# Patient Record
Sex: MALE | Race: WHITE | NOT HISPANIC OR LATINO | Employment: OTHER | URBAN - METROPOLITAN AREA
[De-identification: names, ages, dates, MRNs, and addresses within clinical notes are randomized per-mention and may not be internally consistent; named-entity substitution may affect disease eponyms.]

---

## 2017-04-12 ENCOUNTER — HOSPITAL ENCOUNTER (OUTPATIENT)
Dept: NON INVASIVE DIAGNOSTICS | Facility: CLINIC | Age: 82
Discharge: HOME | End: 2017-04-12
Payer: MEDICARE

## 2017-04-12 DIAGNOSIS — I10 ESSENTIAL (PRIMARY) HYPERTENSION: ICD-10-CM

## 2017-04-12 DIAGNOSIS — E78.5 HYPERLIPIDEMIA: ICD-10-CM

## 2017-04-12 DIAGNOSIS — R00.2 PALPITATIONS: ICD-10-CM

## 2017-04-12 PROCEDURE — 93306 TTE W/DOPPLER COMPLETE: CPT

## 2017-09-12 ENCOUNTER — ALLSCRIPTS OFFICE VISIT (OUTPATIENT)
Dept: OTHER | Facility: OTHER | Age: 82
End: 2017-09-12

## 2018-01-13 VITALS
WEIGHT: 204.56 LBS | SYSTOLIC BLOOD PRESSURE: 104 MMHG | HEART RATE: 83 BPM | DIASTOLIC BLOOD PRESSURE: 72 MMHG | HEIGHT: 72 IN | BODY MASS INDEX: 27.71 KG/M2

## 2018-07-25 ENCOUNTER — APPOINTMENT (EMERGENCY)
Dept: RADIOLOGY | Facility: HOSPITAL | Age: 83
End: 2018-07-25
Payer: MEDICARE

## 2018-07-25 ENCOUNTER — HOSPITAL ENCOUNTER (EMERGENCY)
Facility: HOSPITAL | Age: 83
Discharge: HOME | End: 2018-07-25
Attending: EMERGENCY MEDICINE
Payer: MEDICARE

## 2018-07-25 VITALS
DIASTOLIC BLOOD PRESSURE: 65 MMHG | WEIGHT: 221.56 LBS | OXYGEN SATURATION: 97 % | RESPIRATION RATE: 26 BRPM | BODY MASS INDEX: 30.05 KG/M2 | SYSTOLIC BLOOD PRESSURE: 142 MMHG | TEMPERATURE: 99.8 F | HEART RATE: 79 BPM

## 2018-07-25 DIAGNOSIS — J18.9 COMMUNITY ACQUIRED PNEUMONIA: Primary | ICD-10-CM

## 2018-07-25 LAB
ALBUMIN SERPL BCP-MCNC: 3.5 G/DL (ref 3.5–5)
ALP SERPL-CCNC: 63 U/L (ref 46–116)
ALT SERPL W P-5'-P-CCNC: 19 U/L (ref 12–78)
ANION GAP SERPL CALCULATED.3IONS-SCNC: 6 MMOL/L (ref 4–13)
AST SERPL W P-5'-P-CCNC: 17 U/L (ref 5–45)
BACTERIA UR QL AUTO: ABNORMAL /HPF
BASOPHILS # BLD AUTO: 0.05 THOUSANDS/ΜL (ref 0–0.1)
BASOPHILS NFR BLD AUTO: 0 % (ref 0–1)
BILIRUB SERPL-MCNC: 1.18 MG/DL (ref 0.2–1)
BILIRUB UR QL STRIP: ABNORMAL
BUN SERPL-MCNC: 16 MG/DL (ref 5–25)
CALCIUM SERPL-MCNC: 9.5 MG/DL (ref 8.3–10.1)
CAOX CRY URNS QL MICRO: ABNORMAL /HPF
CHLORIDE SERPL-SCNC: 106 MMOL/L (ref 100–108)
CLARITY UR: CLEAR
CO2 SERPL-SCNC: 25 MMOL/L (ref 21–32)
COLOR UR: ABNORMAL
COLOR, POC: YELLOW
CREAT SERPL-MCNC: 1.12 MG/DL (ref 0.6–1.3)
EOSINOPHIL # BLD AUTO: 0.02 THOUSAND/ΜL (ref 0–0.61)
EOSINOPHIL NFR BLD AUTO: 0 % (ref 0–6)
ERYTHROCYTE [DISTWIDTH] IN BLOOD BY AUTOMATED COUNT: 13 % (ref 11.6–15.1)
GFR SERPL CREATININE-BSD FRML MDRD: 60 ML/MIN/1.73SQ M
GLUCOSE SERPL-MCNC: 129 MG/DL (ref 65–140)
GLUCOSE SERPL-MCNC: 133 MG/DL (ref 65–140)
GLUCOSE UR STRIP-MCNC: NEGATIVE MG/DL
HCT VFR BLD AUTO: 42.6 % (ref 36.5–49.3)
HGB BLD-MCNC: 14.2 G/DL (ref 12–17)
HGB UR QL STRIP.AUTO: ABNORMAL
IMM GRANULOCYTES # BLD AUTO: 0.08 THOUSAND/UL (ref 0–0.2)
IMM GRANULOCYTES NFR BLD AUTO: 1 % (ref 0–2)
KETONES UR STRIP-MCNC: ABNORMAL MG/DL
LACTATE SERPL-SCNC: 1.4 MMOL/L (ref 0.5–2)
LEUKOCYTE ESTERASE UR QL STRIP: NEGATIVE
LIPASE SERPL-CCNC: 77 U/L (ref 73–393)
LYMPHOCYTES # BLD AUTO: 0.78 THOUSANDS/ΜL (ref 0.6–4.47)
LYMPHOCYTES NFR BLD AUTO: 6 % (ref 14–44)
MCH RBC QN AUTO: 31.7 PG (ref 26.8–34.3)
MCHC RBC AUTO-ENTMCNC: 33.3 G/DL (ref 31.4–37.4)
MCV RBC AUTO: 95 FL (ref 82–98)
MONOCYTES # BLD AUTO: 1.26 THOUSAND/ΜL (ref 0.17–1.22)
MONOCYTES NFR BLD AUTO: 10 % (ref 4–12)
MUCOUS THREADS UR QL AUTO: ABNORMAL
NEUTROPHILS # BLD AUTO: 11.07 THOUSANDS/ΜL (ref 1.85–7.62)
NEUTS SEG NFR BLD AUTO: 83 % (ref 43–75)
NITRITE UR QL STRIP: NEGATIVE
NON-SQ EPI CELLS URNS QL MICRO: ABNORMAL /HPF
NRBC BLD AUTO-RTO: 0 /100 WBCS
PH UR STRIP.AUTO: 6 [PH] (ref 4.5–8)
PLATELET # BLD AUTO: 227 THOUSANDS/UL (ref 149–390)
PMV BLD AUTO: 9.5 FL (ref 8.9–12.7)
POTASSIUM SERPL-SCNC: 3.7 MMOL/L (ref 3.5–5.3)
PROT SERPL-MCNC: 7.7 G/DL (ref 6.4–8.2)
PROT UR STRIP-MCNC: ABNORMAL MG/DL
RBC # BLD AUTO: 4.48 MILLION/UL (ref 3.88–5.62)
RBC #/AREA URNS AUTO: ABNORMAL /HPF
SODIUM SERPL-SCNC: 137 MMOL/L (ref 136–145)
SP GR UR STRIP.AUTO: 1.02 (ref 1–1.03)
TROPONIN I SERPL-MCNC: <0.02 NG/ML
UROBILINOGEN UR QL STRIP.AUTO: 1 E.U./DL
WBC # BLD AUTO: 13.26 THOUSAND/UL (ref 4.31–10.16)
WBC #/AREA URNS AUTO: ABNORMAL /HPF

## 2018-07-25 PROCEDURE — 36415 COLL VENOUS BLD VENIPUNCTURE: CPT | Performed by: EMERGENCY MEDICINE

## 2018-07-25 PROCEDURE — 71046 X-RAY EXAM CHEST 2 VIEWS: CPT

## 2018-07-25 PROCEDURE — 96361 HYDRATE IV INFUSION ADD-ON: CPT

## 2018-07-25 PROCEDURE — 96365 THER/PROPH/DIAG IV INF INIT: CPT

## 2018-07-25 PROCEDURE — 81001 URINALYSIS AUTO W/SCOPE: CPT

## 2018-07-25 PROCEDURE — 99285 EMERGENCY DEPT VISIT HI MDM: CPT

## 2018-07-25 PROCEDURE — 84484 ASSAY OF TROPONIN QUANT: CPT | Performed by: EMERGENCY MEDICINE

## 2018-07-25 PROCEDURE — 93005 ELECTROCARDIOGRAM TRACING: CPT

## 2018-07-25 PROCEDURE — 80053 COMPREHEN METABOLIC PANEL: CPT | Performed by: EMERGENCY MEDICINE

## 2018-07-25 PROCEDURE — 83605 ASSAY OF LACTIC ACID: CPT | Performed by: EMERGENCY MEDICINE

## 2018-07-25 PROCEDURE — 87040 BLOOD CULTURE FOR BACTERIA: CPT | Performed by: EMERGENCY MEDICINE

## 2018-07-25 PROCEDURE — 83690 ASSAY OF LIPASE: CPT | Performed by: EMERGENCY MEDICINE

## 2018-07-25 PROCEDURE — 70450 CT HEAD/BRAIN W/O DYE: CPT

## 2018-07-25 PROCEDURE — 82948 REAGENT STRIP/BLOOD GLUCOSE: CPT

## 2018-07-25 PROCEDURE — 71250 CT THORAX DX C-: CPT

## 2018-07-25 PROCEDURE — 96375 TX/PRO/DX INJ NEW DRUG ADDON: CPT

## 2018-07-25 PROCEDURE — 85025 COMPLETE CBC W/AUTO DIFF WBC: CPT | Performed by: EMERGENCY MEDICINE

## 2018-07-25 RX ORDER — MULTIVIT-MIN/IRON/FOLIC ACID/K 18-600-40
1000 CAPSULE ORAL DAILY
COMMUNITY

## 2018-07-25 RX ORDER — AMLODIPINE BESYLATE 5 MG/1
5 TABLET ORAL DAILY
COMMUNITY
End: 2018-11-21 | Stop reason: SDUPTHER

## 2018-07-25 RX ORDER — MULTIVITAMIN
1 CAPSULE ORAL DAILY
COMMUNITY
End: 2021-01-08

## 2018-07-25 RX ORDER — ONDANSETRON 2 MG/ML
4 INJECTION INTRAMUSCULAR; INTRAVENOUS ONCE
Status: COMPLETED | OUTPATIENT
Start: 2018-07-25 | End: 2018-07-25

## 2018-07-25 RX ORDER — MULTIVIT-MIN/IRON/FOLIC ACID/K 18-600-40
CAPSULE ORAL
COMMUNITY
End: 2019-09-17

## 2018-07-25 RX ORDER — TERAZOSIN 5 MG/1
5 CAPSULE ORAL
COMMUNITY

## 2018-07-25 RX ORDER — ASPIRIN 81 MG/1
81 TABLET, CHEWABLE ORAL EVERY OTHER DAY
COMMUNITY

## 2018-07-25 RX ORDER — AZITHROMYCIN 250 MG/1
TABLET, FILM COATED ORAL
Qty: 6 TABLET | Refills: 0 | Status: SHIPPED | OUTPATIENT
Start: 2018-07-25 | End: 2018-07-29

## 2018-07-25 RX ADMIN — CEFTRIAXONE 1000 MG: 1 INJECTION, POWDER, FOR SOLUTION INTRAMUSCULAR; INTRAVENOUS at 18:43

## 2018-07-25 RX ADMIN — SODIUM CHLORIDE 1000 ML: 0.9 INJECTION, SOLUTION INTRAVENOUS at 17:36

## 2018-07-25 RX ADMIN — AZITHROMYCIN MONOHYDRATE 500 MG: 500 INJECTION, POWDER, LYOPHILIZED, FOR SOLUTION INTRAVENOUS at 19:20

## 2018-07-25 RX ADMIN — ONDANSETRON 4 MG: 2 INJECTION, SOLUTION INTRAMUSCULAR; INTRAVENOUS at 17:36

## 2018-07-25 NOTE — DISCHARGE INSTRUCTIONS
Please take the antibiotics I prescribed you and follow-up with your primary doctor in approximately 3 days for re-evaluation and further treatment  Return to the ER for new or worrisome symptoms including worsening shortness of breath  Community Acquired Pneumonia   WHAT YOU NEED TO KNOW:   Community-acquired pneumonia (CAP) is a lung infection that you get outside of a hospital or nursing home setting  Your lungs become inflamed and cannot work well  CAP may be caused by bacteria, viruses, or fungi  DISCHARGE INSTRUCTIONS:   Return to the emergency department if:   · You are confused and cannot think clearly  · You have increased trouble breathing  · Your lips or fingernails turn gray or blue  Contact your healthcare provider if:   · Your symptoms do not get better, or they get worse  · You are urinating less, or not at all  · You have questions or concerns about your condition or care  Medicines:   · Medicines  may be given to treat a bacterial, viral, or fungal infection  You may also be given medicines to dilate your bronchial tubes to help you breathe more easily  · Take your medicine as directed  Contact your healthcare provider if you think your medicine is not helping or if you have side effects  Tell him or her if you are allergic to any medicine  Keep a list of the medicines, vitamins, and herbs you take  Include the amounts, and when and why you take them  Bring the list or the pill bottles to follow-up visits  Carry your medicine list with you in case of an emergency  Follow up with your healthcare provider within 3 days or as directed: You may need another x-ray  Write down your questions so you remember to ask them during your visits  Deep breathing and coughing:  Deep breathing helps open the air passages in your lungs  Coughing helps bring up mucus from your lungs  Take a deep breath and hold the breath as long as you can   Then push the air out of your lungs with a deep, strong cough  Spit out any mucus you have coughed up  Take 10 deep breaths in a row every hour that you are awake  Remember to follow each deep breath with a cough  Do not smoke or allow others to smoke around you:  Nicotine and other chemicals in cigarettes and cigars can cause lung damage  Ask your healthcare provider for information if you currently smoke and need help to quit  E-cigarettes or smokeless tobacco still contain nicotine  Talk to your healthcare provider before you use these products  Manage CAP at home:   · Breathe warm, moist air  This helps loosen mucus  Loosely place a warm, wet washcloth over your nose and mouth  A room humidifier may also help make the air moist     · Drink liquids as directed  Ask your healthcare provider how much liquid to drink each day and which liquids to drink  Liquids help make mucus thin and easier to get out of your body  · Gently tap your chest   This helps loosen mucus so it is easier to cough  Lie with your head lower than your chest several times a day and tap your chest      · Get plenty of rest   Rest helps your body heal   Prevent CAP:   · Wash your hands often with soap and water  Carry germ-killing hand gel with you  You can use the gel to clean your hands when soap and water are not available  Do not touch your eyes, nose, or mouth unless you have washed your hands first      · Clean surfaces often  Clean doorknobs, countertops, cell phones, and other surfaces that are touched often  · Always cover your mouth when you cough  Cough into a tissue or your shirtsleeve so you do not spread germs from your hands  · Try to avoid people who have a cold or the flu  If you are sick, stay away from others as much as possible  · Ask about vaccines  You may need a vaccine to help prevent pneumonia  Get an influenza (flu) vaccine every year as soon as it becomes available    © 2017 [address] [employer] Information is for End User's use only and may not be sold, redistributed or otherwise used for commercial purposes  All illustrations and images included in CareNotes® are the copyrighted property of [employer]  or [employer]  The above information is an  only  It is not intended as medical advice for individual conditions or treatments  Talk to your doctor, nurse or pharmacist before following any medical regimen to see if it is safe and effective for you  Azithromycin (By mouth)   Azithromycin ([id number])  Treats infections  This medicine is a macrolide antibiotic  Brand Name(s): Zithromax, Zithromax Tri-Ran, Zithromax Z-Ran, Zmax   There may be other brand names for this medicine  When This Medicine Should Not Be Used: This medicine is not right for everyone  Do not use it if you had an allergic reaction to azithromycin, erythromycin, or similar medicines, or you have a history of liver problems caused by azithromycin  How to Use This Medicine:   Capsule, Liquid, Packet, Powder, Tablet  · Your doctor will tell you how much medicine to use  Do not use more than directed  · Take all of the medicine in your prescription to clear up your infection, even if you feel better after the first few doses  · Read and follow the patient instructions that come with this medicine  Talk to your doctor or pharmacist if you have any questions  · Multiple dose (Zithromax® oral liquid or tablets):   ¨ You may take this medicine with or without food  ¨ Shake the bottle well before you measure the medicine  Measure the oral liquid medicine with a marked measuring spoon, oral syringe, or medicine cup  · Single dose (Zmax® extended-release oral liquid or powder):   ¨ Liquid:   § Take this medicine on an empty stomach at least 1 hour before you eat, or 2 hours after you eat  § Call your doctor right away if you vomit within 1 hour after you take the medicine    § You must take the liquid within 12 hours after the pharmacist gives it to you  § Shake the bottle well before you measure the medicine  Measure your dose with a marked measuring spoon, cup, or syringe  ¨ Powder:   § Open 1 packet and pour all of the medicine into a glass with about 2 ounces (¼ cup) of water  Mix well and drink the medicine right away  Pour another 2 ounces of water into the same glass, and drink the remaining medicine  · Missed dose: If you are taking multiple doses, take the dose as soon as you remember  If it is almost time for your next dose, wait until then to take a regular dose  Do not use extra medicine to make up for a missed dose  · Store the medicine in a closed container at room temperature, away from heat, moisture, and direct light  · Extended-release oral liquid: Do not refrigerate or freeze  · Oral liquid for 1 dose only: Store at room temperature  Do not store in the refrigerator or allow the medicine to freeze  · Oral liquid for multiple doses: Store at room temperature or in the refrigerator  Use it within 10 days of filling the prescription  Drugs and Foods to Avoid:   Ask your doctor or pharmacist before using any other medicine, including over-the-counter medicines, vitamins, and herbal products  · Some medicines can affect how azithromycin works  Tell your doctor if you are also using any of the following:  ¨ Cyclosporine, digoxin, nelfinavir, or phenytoin  ¨ Blood thinner  [address] Medicine for a heart rhythm problem (including amiodarone, dofetilide, procainamide, quinidine, sotalol)  · Zithromax® for multiple doses: Do not take an antacid that contains magnesium or aluminum at the same time you take Zithromax®  An antacid will affect how the medicine works  Antacids will not affect Zmax® for single dose    Warnings While Using This Medicine:   · Tell your doctor if you are pregnant or breastfeeding, or if you have kidney disease, liver disease, heart disease, heart rhythm problems, heart failure, or myasthenia gravis  Tell your doctor if anyone in your family has heart rhythm problems  · This medicine may cause the following problems:   ¨ Serious skin reactions  ¨ Liver problems  ¨ Infantile hypertrophic pyloric stenosis  ¨ Heart rhythm problems  · This medicine can cause diarrhea  Call your doctor if the diarrhea becomes severe, does not stop, or is bloody  Do not take any medicine to stop diarrhea until you have talked to your doctor  Diarrhea can occur 2 months or more after you stop taking this medicine  It may occur 2 months or more after you stop using this medicine  · Call your doctor if your symptoms do not improve or if they get worse  · Keep all medicine out of the reach of children  Never share your medicine with anyone  Possible Side Effects While Using This Medicine:   Call your doctor right away if you notice any of these side effects:  · Allergic reaction: Itching or hives, swelling in your face or hands, swelling or tingling in your mouth or throat, chest tightness, trouble breathing  · Blistering, peeling, red skin rash  · Dark urine, pale stools, nausea, vomiting, loss of appetite, stomach pain, yellow skin or eyes  · Double vision, tiredness or weakness  · Fainting, dizziness, lightheadedness  · Fast, pounding, or uneven heartbeat, chest pain  · Feeling irritable or vomits after feeding (in babies)  · Severe diarrhea that may contain blood, stomach cramps, fever  If you notice these less serious side effects, talk with your doctor:   · Mild diarrhea, nausea, vomiting, stomach pain  If you notice other side effects that you think are caused by this medicine, tell your doctor  Call your doctor for medical advice about side effects  You may report side effects to FDA at 9-591-JRK-5458  © 2017 [facility] Information is for End User's use only and may not be sold, redistributed or otherwise used for commercial purposes  The above information is an  only   It is not intended as medical advice for individual conditions or treatments  Talk to your doctor, nurse or pharmacist before following any medical regimen to see if it is safe and effective for you

## 2018-07-25 NOTE — ED ATTENDING ATTESTATION
IXi DO, saw and evaluated the patient  I have discussed the patient with the resident/non-physician practitioner and agree with the resident's/non-physician practitioner's findings, Plan of Care, and MDM as documented in the resident's/non-physician practitioner's note, except where noted  All available labs and Radiology studies were reviewed  At this point I agree with the current assessment done in the Emergency Department  I have conducted an independent evaluation of this patient a history and physical is as follows:      Critical Care Time  CritCare Time    Procedures     80 yr male to the ED with slurr / slow to speech, mild temp elevation, urine incontinence with dysyria, and diarrhea since last night  No headache or neck pain  No isolated weakness or aphasia  Sx similar to prior  UTI  Exm; coop and alert; mild facial assym with decr on the right  No sensory changes  Abd: soft and nontender  Pln CT head, neuro consult wo alert, jimenez for urosepsis

## 2018-07-26 LAB
ATRIAL RATE: 91 BPM
P AXIS: 15 DEGREES
PR INTERVAL: 270 MS
QRS AXIS: -31 DEGREES
QRSD INTERVAL: 136 MS
QT INTERVAL: 380 MS
QTC INTERVAL: 467 MS
T WAVE AXIS: -12 DEGREES
VENTRICULAR RATE: 91 BPM

## 2018-07-26 PROCEDURE — 93010 ELECTROCARDIOGRAM REPORT: CPT | Performed by: INTERNAL MEDICINE

## 2018-07-26 NOTE — ED PROVIDER NOTES
History  Chief Complaint   Patient presents with    Altered Mental Status     pt was working in yard today and since has been lethargic and "not himself" last known normal was yesterday  pt was working with fertilizer today  75-year-old man with a history of hypertension presents for evaluation of altered mental status  Patient was found today by his wife who said he was not acting his normal self in that he looked run down and fatigued  Patient had approximately 2 episodes of nonbloody vomiting earlier today  He notes dysuria and urinary frequency over the past couple of days with urinary incontinence overnight  He has been doing a lot of yard work recently and has not been taking in much p o  Patient also says he has had dyspnea over the past few days without fevers, cough, or chest pain  Family thought that his speech was slurred and that he had a mild right-sided facial droop prompting them to call EMS  No recent falls, head trauma, or blood thinner use  Patient had normal vital signs and blood sugar pre-hospital  On arrival, patient is afebrile with otherwise normal vital signs  Physical exam shows possible right-sided facial droop without other deficits on neurologic exam   Patient has a soft/nontender abdomen and clear lungs  His exam is otherwise unremarkable  Unclear etiology of patient's symptoms  No indication for stroke alert at this time  Will perform septic and cardiac workups  Will perform CT head  Further workup and management pending above results  Prior to Admission Medications   Prescriptions Last Dose Informant Patient Reported? Taking?    Ascorbic Acid (VITAMIN C) 500 MG CAPS 7/24/2018 at Unknown time  Yes Yes   Sig: Take by mouth   Cholecalciferol (VITAMIN D) 2000 units CAPS 7/24/2018 at Unknown time  Yes Yes   Sig: Take by mouth   Multiple Vitamin (MULTIVITAMIN) capsule 7/24/2018 at Unknown time  Yes Yes   Sig: Take 1 capsule by mouth daily   amLODIPine (NORVASC) 5 mg tablet 7/24/2018 at Unknown time  Yes Yes   Sig: Take 5 mg by mouth daily   aspirin 81 mg chewable tablet 7/24/2018 at Unknown time  Yes Yes   Sig: Chew 81 mg daily   terazosin (HYTRIN) 5 mg capsule 7/24/2018 at Unknown time  Yes Yes   Sig: Take 5 mg by mouth daily at bedtime      Facility-Administered Medications: None       History reviewed  No pertinent past medical history  History reviewed  No pertinent surgical history  History reviewed  No pertinent family history  I have reviewed and agree with the history as documented  Social History   Substance Use Topics    Smoking status: Not on file    Smokeless tobacco: Not on file    Alcohol use Not on file        Review of Systems   Constitutional: Negative for chills and fever  HENT: Negative for rhinorrhea and sore throat  Eyes: Negative for photophobia and visual disturbance  Respiratory: Positive for shortness of breath  Negative for cough  Cardiovascular: Negative for chest pain and leg swelling  Gastrointestinal: Positive for nausea and vomiting  Negative for abdominal pain and diarrhea  Genitourinary: Positive for dysuria and frequency  Negative for hematuria  Musculoskeletal: Negative for back pain and neck pain  Skin: Negative for rash and wound  Neurological: Negative for light-headedness and headaches         Physical Exam  ED Triage Vitals   Temperature Pulse Respirations Blood Pressure SpO2   07/25/18 1708 07/25/18 1705 07/25/18 1705 07/25/18 1708 07/25/18 1705   99 8 °F (37 7 °C) 92 16 (!) 175/87 98 %      Temp Source Heart Rate Source Patient Position - Orthostatic VS BP Location FiO2 (%)   07/25/18 1708 07/25/18 1705 07/25/18 1705 07/25/18 1708 --   Oral Monitor Lying Right arm       Pain Score       07/25/18 1705       No Pain           Orthostatic Vital Signs  Vitals:    07/25/18 1745 07/25/18 1824 07/25/18 1845 07/25/18 1942   BP:  140/74 140/74 142/65   Pulse: 90 86 80 79   Patient Position - Orthostatic VS: Lying Lying Lying       Physical Exam   Constitutional: He is oriented to person, place, and time  He appears well-developed and well-nourished  No distress  HENT:   Head: Normocephalic and atraumatic  Eyes: Conjunctivae and EOM are normal  Pupils are equal, round, and reactive to light  No scleral icterus  Neck: Neck supple  No JVD present  Cardiovascular: Normal rate, regular rhythm and normal heart sounds  Exam reveals no gallop and no friction rub  No murmur heard  Pulmonary/Chest: Effort normal and breath sounds normal  No respiratory distress  He has no wheezes  He has no rales  Abdominal: Soft  He exhibits no distension  There is no tenderness  There is no rebound and no guarding  Musculoskeletal: He exhibits no edema or tenderness  Neurological: He is alert and oriented to person, place, and time  GCS 15, mild right-sided facial droop, no other focal deficits, 5/5 strength throughout, no gross sensory deficits, visual fields intact, normal rapid alternating movements, no abnormalities with cerebellar testing, NIH stroke scale of 1   Skin: Skin is warm and dry  He is not diaphoretic  Psychiatric: He has a normal mood and affect  His behavior is normal  Thought content normal    Vitals reviewed        ED Medications  Medications    EMS REPLENISHMENT MED ( Does not apply Given to EMS 7/25/18 1711)   sodium chloride 0 9 % bolus 1,000 mL (0 mL Intravenous Stopped 7/25/18 2034)   ondansetron (ZOFRAN) injection 4 mg (4 mg Intravenous Given 7/25/18 1736)   ceftriaxone (ROCEPHIN) 1 g/50 mL in dextrose IVPB (0 mg Intravenous Stopped 7/25/18 1913)   azithromycin (ZITHROMAX) 500 mg in sodium chloride 0 9% 250mL IVPB 500 mg (0 mg Intravenous Stopped 7/25/18 2034)       Diagnostic Studies  Results Reviewed     Procedure Component Value Units Date/Time    Urine Microscopic [[id number]]  (Abnormal) Collected:  07/25/18 1817    Lab Status:  Final result Specimen:  Urine Updated:  07/25/18 2029     RBC, UA 2-4 (A) /hpf      WBC, UA None Seen /hpf      Epithelial Cells Occasional /hpf      Bacteria, UA None Seen /hpf      Ca Oxalate Cristina, UA Innumerable (A) /hpf      MUCOUS THREADS Occasional (A)    POCT urinalysis dipstick [[id number]]  (Normal) Resulted:  07/25/18 1807    Lab Status:  Final result Specimen:  Urine Updated:  07/25/18 1830     Color, UA yellow    Lactic acid, plasma [[id number]]  (Normal) Collected:  07/25/18 1736    Lab Status:  Final result Specimen:  Blood from Arm, Left Updated:  07/25/18 1824     LACTIC ACID 1 4 mmol/L     Narrative:         Result may be elevated if tourniquet was used during collection  Blood culture #2 [[id number]] Collected:  07/25/18 1737    Lab Status:   In process Specimen:  Blood from Arm, Right Updated:  07/25/18 1809    ED Urine Macroscopic [[id number]]  (Abnormal) Collected:  07/25/18 1817    Lab Status:  Final result Specimen:  Urine Updated:  07/25/18 1807     Color, UA Hermelinda     Clarity, UA Clear     pH, UA 6 0     Leukocytes, UA Negative     Nitrite, UA Negative     Protein,  (2+) (A) mg/dl      Glucose, UA Negative mg/dl      Ketones, UA 15 (1+) (A) mg/dl      Urobilinogen, UA 1 0 E U /dl      Bilirubin, UA Interference- unable to analyze (A)     Blood, UA Moderate (A)     Specific Hope, UA 1 025    Narrative:       CLINITEK RESULT    Troponin I [[id number]]  (Normal) Collected:  07/25/18 1725    Lab Status:  Final result Specimen:  Blood from Arm, Left Updated:  07/25/18 1800     Troponin I <0 02 ng/mL     Comprehensive metabolic panel [[id number]]  (Abnormal) Collected:  07/25/18 1725    Lab Status:  Final result Specimen:  Blood from Arm, Left Updated:  07/25/18 1759     Sodium 137 mmol/L      Potassium 3 7 mmol/L      Chloride 106 mmol/L      CO2 25 mmol/L      Anion Gap 6 mmol/L      BUN 16 mg/dL      Creatinine 1 12 mg/dL      Glucose 129 mg/dL      Calcium 9 5 mg/dL      AST 17 U/L      ALT 19 U/L      Alkaline Phosphatase 63 U/L      Total Protein 7 7 g/dL Albumin 3 5 g/dL      Total Bilirubin 1 18 (H) mg/dL      eGFR 60 ml/min/1 73sq m     Narrative:         National Kidney Disease Education Program recommendations are as follows:  GFR calculation is accurate only with a steady state creatinine  Chronic Kidney disease less than 60 ml/min/1 73 sq  meters  Kidney failure less than 15 ml/min/1 73 sq  meters  Lipase [[id number]]  (Normal) Collected:  07/25/18 1725    Lab Status:  Final result Specimen:  Blood from Arm, Left Updated:  07/25/18 1759     Lipase 77 u/L     CBC and differential [[id number]]  (Abnormal) Collected:  07/25/18 1725    Lab Status:  Final result Specimen:  Blood from Arm, Left Updated:  07/25/18 1751     WBC 13 26 (H) Thousand/uL      RBC 4 48 Million/uL      Hemoglobin 14 2 g/dL      Hematocrit 42 6 %      MCV 95 fL      MCH 31 7 pg      MCHC 33 3 g/dL      RDW 13 0 %      MPV 9 5 fL      Platelets 175 Thousands/uL      nRBC 0 /100 WBCs      Neutrophils Relative 83 (H) %      Immat GRANS % 1 %      Lymphocytes Relative 6 (L) %      Monocytes Relative 10 %      Eosinophils Relative 0 %      Basophils Relative 0 %      Neutrophils Absolute 11 07 (H) Thousands/µL      Immature Grans Absolute 0 08 Thousand/uL      Lymphocytes Absolute 0 78 Thousands/µL      Monocytes Absolute 1 26 (H) Thousand/µL      Eosinophils Absolute 0 02 Thousand/µL      Basophils Absolute 0 05 Thousands/µL     Blood culture #1 [[id number]] Collected:  07/25/18 1736    Lab Status: In process Specimen:  Blood from Arm, Left Updated:  07/25/18 1746    Fingerstick Glucose (POCT) [[id number]]  (Normal) Collected:  07/25/18 1711    Lab Status:  Final result Updated:  07/25/18 1712     POC Glucose 133 mg/dl                  CT chest without contrast   Final Result by Rashad Moura MD (07/25 1824)      Consolidation in the posterior right lower lobe consistent with pneumonia  Lungs otherwise clear    Follow-up CT scan of the chest recommended after treatment in 1-2 months to ensure complete resolution  Gallstones and sludge  No surrounding inflammation  Small hiatal hernia  Workstation performed: [id number]         CT head without contrast   Final Result by Tyrus Apley, MD (07/25 1741)      No acute intracranial abnormality  Workstation performed: [id number]         XR chest 2 views   Final Result by Otilio Malloy MD (07/25 1740)      Increased density seen in the left lower lung may be due to atelectasis, less likely due to infiltrate  Follow-up suggested in 6-8 weeks to demonstrate resolution   Hypoinflated lungs   No pulmonary congestion seen            Workstation performed: [id number]               Procedures  Procedures      Phone Consults  ED Phone Contact    ED Course           Identification of Seniors at Risk      Most Recent Value   (ISAR) Identification of Seniors at Risk   Before the illness or injury that brought you to the Emergency, did you need someone to help you on a regular basis? 0 Filed at: 07/25/2018 1721   In the last 24 hours, have you needed more help than usual?  0 Filed at: 07/25/2018 1721   Have you been hospitalized for one or more nights during the past 6 months? 0 Filed at: 07/25/2018 1721   In general, do you see well?  0 [glasses, doesnt wear them] Filed at: 07/25/2018 1721   In general, do you have serious problems with your memory? 0 Filed at: 07/25/2018 1721   Do you take more than three different medications every day? 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  He was given ceftriaxone and azithromycin for community-acquired pneumonia  He was given IV fluids and discharged with a Z-Ran  Patient was given instructions for outpatient follow-up and strict return precautions for worsening symptoms  CritCare Time    Disposition  Final diagnoses:   Community acquired pneumonia     Time reflects when diagnosis was documented in both MDM as applicable and the Disposition within this note     Time User Action Codes Description Comment    7/25/2018  7:23 PM Dinh Chavira Add [J18 9] Community acquired pneumonia       ED Disposition     ED Disposition Condition Comment    Discharge  Rosie Watson discharge to home/self care      Condition at discharge: Good        Follow-up Information     Follow up With Specialties Details Why Contact Info Additional Information    Cary Garcia,  Family Medicine Schedule an appointment as soon as possible for a visit in 3 days  [address] [address] Emergency Department Emergency Medicine  As needed [address]  [phone] [address] [place] [address] ED, [address] [place] [address], [id number]          Discharge Medication List as of 7/25/2018  7:26 PM      START taking these medications    Details   azithromycin (ZITHROMAX Z-RAN) 250 mg tablet Take 2 tablets today then 1 tablet daily x 4 days, Print         CONTINUE these medications which have NOT CHANGED    Details   amLODIPine (NORVASC) 5 mg tablet Take 5 mg by mouth daily, Historical Med      Ascorbic Acid (VITAMIN C) 500 MG CAPS Take by mouth, Historical Med      aspirin 81 mg chewable tablet Chew 81 mg daily, Historical Med      Cholecalciferol (VITAMIN D) 2000 units CAPS Take by mouth, Historical Med      Multiple Vitamin (MULTIVITAMIN) capsule Take 1 capsule by mouth daily, Historical Med      terazosin (HYTRIN) 5 mg capsule Take 5 mg by mouth daily at bedtime, Historical Med           No discharge procedures on file  ED Provider  Attending physically available and evaluated Andressa Bikeith RANDOLPH managed the patient along with the ED Attending      Electronically Signed by         Jamie Huff MD  07/26/18 9498

## 2018-07-30 LAB
BACTERIA BLD CULT: NORMAL
BACTERIA BLD CULT: NORMAL

## 2018-09-04 ENCOUNTER — OFFICE VISIT (OUTPATIENT)
Dept: CARDIOLOGY CLINIC | Facility: CLINIC | Age: 83
End: 2018-09-04
Payer: MEDICARE

## 2018-09-04 VITALS
BODY MASS INDEX: 27.5 KG/M2 | HEART RATE: 66 BPM | SYSTOLIC BLOOD PRESSURE: 118 MMHG | HEIGHT: 72 IN | WEIGHT: 203 LBS | DIASTOLIC BLOOD PRESSURE: 78 MMHG

## 2018-09-04 DIAGNOSIS — I10 ESSENTIAL (PRIMARY) HYPERTENSION: Primary | ICD-10-CM

## 2018-09-04 DIAGNOSIS — E78.00 PURE HYPERCHOLESTEROLEMIA: ICD-10-CM

## 2018-09-04 DIAGNOSIS — R94.31 ABNORMAL EKG: ICD-10-CM

## 2018-09-04 PROCEDURE — 99214 OFFICE O/P EST MOD 30 MIN: CPT | Performed by: INTERNAL MEDICINE

## 2018-09-04 RX ORDER — ACETAMINOPHEN 325 MG/1
TABLET ORAL
COMMUNITY
End: 2019-09-17

## 2018-09-04 NOTE — PATIENT INSTRUCTIONS
I will continue the patient's present medical regimen  The patient appears well compensated  I have asked the patient to call if there is a problem in the interim otherwise I will see the patient in 12 months time

## 2018-09-04 NOTE — PROGRESS NOTES
Cardiology Follow Up    Magen Lyons  1934  [id number]  [address] [facility] [address] [place] [address]  [phone]    1  Essential (primary) hypertension     2  Pure hypercholesterolemia     3  Abnormal EKG         Interval History:  Patient is here for a follow-up visit  He was last seen by me in September  His most recent echocardiogram was done in April of last year demonstrated preserved LV systolic function with no significant valvular heart disease and no change compared to a prior study  He had a nuclear stress test done June 2013 which showed no evidence of ischemia  He has no chest pain or significant dyspnea  He had been seen in the emergency room in July in reference to a respiratory issue  He did have an EKG done at that time which demonstrated sinus rhythm with first-degree AV block and a right bundle branch block  There was no change in EKG compared to one done September 2017  There is no problem list on file for this patient  No past medical history on file  Social History     Social History    Marital status: /Civil Union     Spouse name: N/A    Number of children: N/A    Years of education: N/A     Occupational History    Not on file  Social History Main Topics    Smoking status: Not on file    Smokeless tobacco: Not on file    Alcohol use Not on file    Drug use: Unknown    Sexual activity: Not on file     Other Topics Concern    Not on file     Social History Narrative    No narrative on file      No family history on file  No past surgical history on file      Current Outpatient Prescriptions:     amLODIPine (NORVASC) 5 mg tablet, Take 5 mg by mouth daily, Disp: , Rfl:     Ascorbic Acid (VITAMIN C) 500 MG CAPS, Take by mouth, Disp: , Rfl:     aspirin 81 mg chewable tablet, Chew 81 mg daily, Disp: , Rfl:     Cholecalciferol (VITAMIN D) 2000 units CAPS, Take by mouth, Disp: , Rfl:     Multiple Vitamin (MULTIVITAMIN) capsule, Take 1 capsule by mouth daily, Disp: , Rfl:     terazosin (HYTRIN) 5 mg capsule, Take 5 mg by mouth daily at bedtime, Disp: , Rfl:   No Known Allergies    Labs:not applicable  Imaging: No results found  Review of Systems:  Review of Systems   All other systems reviewed and are negative  Physical Exam:  Physical Exam   Constitutional: He is oriented to person, place, and time  He appears well-developed and well-nourished  HENT:   Head: Normocephalic and atraumatic  Eyes: Conjunctivae are normal  Pupils are equal, round, and reactive to light  Neck: Normal range of motion  Neck supple  Cardiovascular: Normal rate and normal heart sounds  Pulmonary/Chest: Effort normal and breath sounds normal    Neurological: He is alert and oriented to person, place, and time  Skin: Skin is warm and dry  Psychiatric: He has a normal mood and affect  Vitals reviewed  Discussion/Summary:I will continue the patient's present medical regimen  The patient appears well compensated  I have asked the patient to call if there is a problem in the interim otherwise I will see the patient in 12 months time

## 2018-11-21 DIAGNOSIS — I10 HYPERTENSION, UNSPECIFIED TYPE: Primary | ICD-10-CM

## 2018-11-21 RX ORDER — AMLODIPINE BESYLATE 5 MG/1
5 TABLET ORAL DAILY
Qty: 90 TABLET | Refills: 3 | Status: SHIPPED | OUTPATIENT
Start: 2018-11-21 | End: 2019-10-28 | Stop reason: SDUPTHER

## 2019-03-01 ENCOUNTER — TRANSCRIBE ORDERS (OUTPATIENT)
Dept: ADMINISTRATIVE | Age: 84
End: 2019-03-01

## 2019-03-01 ENCOUNTER — APPOINTMENT (OUTPATIENT)
Dept: RADIOLOGY | Age: 84
End: 2019-03-01
Payer: MEDICARE

## 2019-03-01 DIAGNOSIS — R06.02 SHORTNESS OF BREATH: Primary | ICD-10-CM

## 2019-03-01 DIAGNOSIS — R06.02 SHORTNESS OF BREATH: ICD-10-CM

## 2019-03-01 PROCEDURE — 71046 X-RAY EXAM CHEST 2 VIEWS: CPT

## 2019-09-12 NOTE — PROGRESS NOTES
Cardiology Follow Up    Maryellen Nesbitt  1934  [id number]  [address] [facility] [place]  [facility]   [phone]    1  Essential (primary) hypertension  POCT ECG   2  Pure hypercholesterolemia     3  Abnormal EKG  POCT ECG       Interval History:  Patient is here for a follow-up visit  He was most recently seen by me in 2018  His most recent echocardiogram was done in 2017 demonstrated preserved LV systolic function with no significant valvular heart disease and no change compared to a prior study  He had a nuclear stress test done 2013 which showed no evidence of ischemia  Patient has been well  He has had no chest pain or significant dyspnea  His EKG today demonstrates sinus rhythm with right bundle branch block  There is no significant change compared to a prior tracing done 2018  His vital signs are stable today  There is no problem list on file for this patient  No past medical history on file    Social History     Socioeconomic History    Marital status: /Civil Union     Spouse name: Not on file    Number of children: Not on file    Years of education: Not on file    Highest education level: Not on file   Occupational History    Not on file   Social Needs    Financial resource strain: Not on file    Food insecurity:     Worry: Not on file     Inability: Not on file    Transportation needs:     Medical: Not on file     Non-medical: Not on file   Tobacco Use    Smoking status: Former Smoker     Types: Cigarettes     Last attempt to quit: 1975     Years since quittin 7    Smokeless tobacco: Never Used   Substance and Sexual Activity    Alcohol use: Not on file    Drug use: Not on file    Sexual activity: Not on file   Lifestyle    Physical activity:     Days per week: Not on file     Minutes per session: Not on file    Stress: Not on file   Relationships    Social connections:     Talks on phone: Not on file     Gets together: Not on file     Attends [employer] service: Not on file     Active member of club or organization: Not on file     Attends meetings of clubs or organizations: Not on file     Relationship status: Not on file    Intimate partner violence:     Fear of current or ex partner: Not on file     Emotionally abused: Not on file     Physically abused: Not on file     Forced sexual activity: Not on file   Other Topics Concern    Not on file   Social History Narrative    Not on file      No family history on file  No past surgical history on file  Current Outpatient Medications:     amLODIPine (NORVASC) 5 mg tablet, Take 1 tablet (5 mg total) by mouth daily, Disp: 90 tablet, Rfl: 3    aspirin 81 mg chewable tablet, Chew 81 mg every other day  , Disp: , Rfl:     Cholecalciferol (VITAMIN D) 2000 units CAPS, Take by mouth, Disp: , Rfl:     Multiple Vitamin (MULTIVITAMIN) capsule, Take 1 capsule by mouth daily, Disp: , Rfl:     terazosin (HYTRIN) 5 mg capsule, Take 5 mg by mouth daily at bedtime, Disp: , Rfl:   No Known Allergies    Labs:not applicable  Imaging: No results found  Review of Systems:  Review of Systems   All other systems reviewed and are negative  Physical Exam:  /68 (BP Location: Left arm, Cuff Size: Standard)   Pulse 78   Wt 88 5 kg (195 lb)   BMI 26 45 kg/m²   Physical Exam   Constitutional: He is oriented to person, place, and time  He appears well-developed and well-nourished  HENT:   Head: Normocephalic and atraumatic  Eyes: Pupils are equal, round, and reactive to light  Conjunctivae are normal    Neck: Normal range of motion  Neck supple  Cardiovascular: Normal rate and normal heart sounds  Pulmonary/Chest: Effort normal and breath sounds normal    Neurological: He is alert and oriented to person, place, and time  Skin: Skin is warm and dry     Psychiatric: He has a normal mood and affect  Vitals reviewed  Discussion/Summary:I will continue the patient's present medical regimen  Patient appears well compensated  I have asked the patient to call if there is a problem in the interim otherwise I will see the patient in one years time

## 2019-09-17 ENCOUNTER — OFFICE VISIT (OUTPATIENT)
Dept: CARDIOLOGY CLINIC | Facility: CLINIC | Age: 84
End: 2019-09-17
Payer: MEDICARE

## 2019-09-17 VITALS
HEART RATE: 78 BPM | SYSTOLIC BLOOD PRESSURE: 128 MMHG | WEIGHT: 195 LBS | DIASTOLIC BLOOD PRESSURE: 68 MMHG | BODY MASS INDEX: 26.45 KG/M2

## 2019-09-17 DIAGNOSIS — R94.31 ABNORMAL EKG: ICD-10-CM

## 2019-09-17 DIAGNOSIS — I10 ESSENTIAL (PRIMARY) HYPERTENSION: Primary | ICD-10-CM

## 2019-09-17 DIAGNOSIS — E78.00 PURE HYPERCHOLESTEROLEMIA: ICD-10-CM

## 2019-09-17 PROCEDURE — 99214 OFFICE O/P EST MOD 30 MIN: CPT | Performed by: INTERNAL MEDICINE

## 2019-09-17 PROCEDURE — 93000 ELECTROCARDIOGRAM COMPLETE: CPT | Performed by: INTERNAL MEDICINE

## 2019-10-28 DIAGNOSIS — I10 HYPERTENSION, UNSPECIFIED TYPE: ICD-10-CM

## 2019-10-28 RX ORDER — AMLODIPINE BESYLATE 5 MG/1
TABLET ORAL
Qty: 90 TABLET | Refills: 3 | Status: SHIPPED | OUTPATIENT
Start: 2019-10-28

## 2020-05-26 ENCOUNTER — OFFICE VISIT (OUTPATIENT)
Dept: GERIATRICS | Age: 85
End: 2020-05-26
Payer: MEDICARE

## 2020-05-26 VITALS
BODY MASS INDEX: 26.38 KG/M2 | WEIGHT: 194.8 LBS | TEMPERATURE: 97.8 F | DIASTOLIC BLOOD PRESSURE: 72 MMHG | SYSTOLIC BLOOD PRESSURE: 122 MMHG | HEART RATE: 61 BPM | HEIGHT: 72 IN | OXYGEN SATURATION: 91 %

## 2020-05-26 DIAGNOSIS — F32.A DEPRESSION, UNSPECIFIED DEPRESSION TYPE: ICD-10-CM

## 2020-05-26 DIAGNOSIS — K59.09 OTHER CONSTIPATION: ICD-10-CM

## 2020-05-26 DIAGNOSIS — I10 ESSENTIAL HYPERTENSION: ICD-10-CM

## 2020-05-26 DIAGNOSIS — Z97.2 WEARS DENTURES: ICD-10-CM

## 2020-05-26 DIAGNOSIS — R32 URINARY INCONTINENCE, UNSPECIFIED TYPE: ICD-10-CM

## 2020-05-26 DIAGNOSIS — R41.3 MEMORY LOSS: ICD-10-CM

## 2020-05-26 DIAGNOSIS — E78.49 OTHER HYPERLIPIDEMIA: ICD-10-CM

## 2020-05-26 DIAGNOSIS — F03.90 MILD DEMENTIA (HCC): Primary | ICD-10-CM

## 2020-05-26 PROCEDURE — 99205 OFFICE O/P NEW HI 60 MIN: CPT | Performed by: STUDENT IN AN ORGANIZED HEALTH CARE EDUCATION/TRAINING PROGRAM

## 2020-05-27 PROBLEM — R32 URINARY INCONTINENCE: Status: ACTIVE | Noted: 2020-05-27

## 2020-05-27 PROBLEM — F32.A DEPRESSION: Status: ACTIVE | Noted: 2020-05-27

## 2020-05-27 PROBLEM — F03.90 MILD DEMENTIA (HCC): Status: ACTIVE | Noted: 2020-05-27

## 2020-05-27 PROBLEM — F03.A0 MILD DEMENTIA: Status: ACTIVE | Noted: 2020-05-27

## 2020-05-27 PROBLEM — I10 ESSENTIAL HYPERTENSION: Status: ACTIVE | Noted: 2020-05-27

## 2020-05-27 PROBLEM — Z97.2 WEARS DENTURES: Status: ACTIVE | Noted: 2020-05-27

## 2020-05-27 PROBLEM — E78.49 OTHER HYPERLIPIDEMIA: Status: ACTIVE | Noted: 2020-05-27

## 2020-05-27 PROBLEM — K59.09 OTHER CONSTIPATION: Status: ACTIVE | Noted: 2020-05-27

## 2020-05-28 ENCOUNTER — HOSPITAL ENCOUNTER (OUTPATIENT)
Dept: RADIOLOGY | Age: 85
Discharge: HOME | End: 2020-05-28
Payer: MEDICARE

## 2020-05-28 DIAGNOSIS — R41.3 MEMORY LOSS: ICD-10-CM

## 2020-05-28 PROCEDURE — 70551 MRI BRAIN STEM W/O DYE: CPT

## 2020-06-02 ENCOUNTER — APPOINTMENT (OUTPATIENT)
Dept: LAB | Age: 85
End: 2020-06-02
Payer: MEDICARE

## 2020-06-02 DIAGNOSIS — R41.3 MEMORY LOSS: ICD-10-CM

## 2020-06-02 LAB
ALBUMIN SERPL BCP-MCNC: 3.7 G/DL (ref 3.5–5)
ALP SERPL-CCNC: 74 U/L (ref 46–116)
ALT SERPL W P-5'-P-CCNC: 22 U/L (ref 12–78)
ANION GAP SERPL CALCULATED.3IONS-SCNC: 4 MMOL/L (ref 4–13)
AST SERPL W P-5'-P-CCNC: 16 U/L (ref 5–45)
BASOPHILS # BLD AUTO: 0.08 THOUSANDS/ΜL (ref 0–0.1)
BASOPHILS NFR BLD AUTO: 1 % (ref 0–1)
BILIRUB SERPL-MCNC: 0.73 MG/DL (ref 0.2–1)
BUN SERPL-MCNC: 15 MG/DL (ref 5–25)
CALCIUM ALBUM COR SERPL-MCNC: 10.4 MG/DL (ref 8.3–10.1)
CALCIUM SERPL-MCNC: 10.2 MG/DL (ref 8.3–10.1)
CHLORIDE SERPL-SCNC: 107 MMOL/L (ref 100–108)
CO2 SERPL-SCNC: 30 MMOL/L (ref 21–32)
CREAT SERPL-MCNC: 1.08 MG/DL (ref 0.6–1.3)
EOSINOPHIL # BLD AUTO: 0.32 THOUSAND/ΜL (ref 0–0.61)
EOSINOPHIL NFR BLD AUTO: 4 % (ref 0–6)
ERYTHROCYTE [DISTWIDTH] IN BLOOD BY AUTOMATED COUNT: 13.2 % (ref 11.6–15.1)
FOLATE SERPL-MCNC: >20 NG/ML (ref 3.1–17.5)
GFR SERPL CREATININE-BSD FRML MDRD: 62 ML/MIN/1.73SQ M
GLUCOSE P FAST SERPL-MCNC: 92 MG/DL (ref 65–99)
HCT VFR BLD AUTO: 46.3 % (ref 36.5–49.3)
HGB BLD-MCNC: 15.7 G/DL (ref 12–17)
IMM GRANULOCYTES # BLD AUTO: 0.03 THOUSAND/UL (ref 0–0.2)
IMM GRANULOCYTES NFR BLD AUTO: 0 % (ref 0–2)
LYMPHOCYTES # BLD AUTO: 2.16 THOUSANDS/ΜL (ref 0.6–4.47)
LYMPHOCYTES NFR BLD AUTO: 30 % (ref 14–44)
MCH RBC QN AUTO: 31.9 PG (ref 26.8–34.3)
MCHC RBC AUTO-ENTMCNC: 33.9 G/DL (ref 31.4–37.4)
MCV RBC AUTO: 94 FL (ref 82–98)
MONOCYTES # BLD AUTO: 0.81 THOUSAND/ΜL (ref 0.17–1.22)
MONOCYTES NFR BLD AUTO: 11 % (ref 4–12)
NEUTROPHILS # BLD AUTO: 3.8 THOUSANDS/ΜL (ref 1.85–7.62)
NEUTS SEG NFR BLD AUTO: 54 % (ref 43–75)
NRBC BLD AUTO-RTO: 0 /100 WBCS
PLATELET # BLD AUTO: 259 THOUSANDS/UL (ref 149–390)
PMV BLD AUTO: 9.9 FL (ref 8.9–12.7)
POTASSIUM SERPL-SCNC: 3.8 MMOL/L (ref 3.5–5.3)
PROT SERPL-MCNC: 8 G/DL (ref 6.4–8.2)
RBC # BLD AUTO: 4.92 MILLION/UL (ref 3.88–5.62)
SODIUM SERPL-SCNC: 141 MMOL/L (ref 136–145)
T4 FREE SERPL-MCNC: 0.94 NG/DL (ref 0.76–1.46)
TSH SERPL DL<=0.05 MIU/L-ACNC: 6.19 UIU/ML (ref 0.36–3.74)
VIT B12 SERPL-MCNC: 540 PG/ML (ref 100–900)
WBC # BLD AUTO: 7.2 THOUSAND/UL (ref 4.31–10.16)

## 2020-06-02 PROCEDURE — 85025 COMPLETE CBC W/AUTO DIFF WBC: CPT

## 2020-06-02 PROCEDURE — 84439 ASSAY OF FREE THYROXINE: CPT

## 2020-06-02 PROCEDURE — 82607 VITAMIN B-12: CPT

## 2020-06-02 PROCEDURE — 36415 COLL VENOUS BLD VENIPUNCTURE: CPT

## 2020-06-02 PROCEDURE — 80053 COMPREHEN METABOLIC PANEL: CPT

## 2020-06-02 PROCEDURE — 84443 ASSAY THYROID STIM HORMONE: CPT

## 2020-06-02 PROCEDURE — 82746 ASSAY OF FOLIC ACID SERUM: CPT

## 2020-06-16 ENCOUNTER — OFFICE VISIT (OUTPATIENT)
Dept: GERIATRICS | Age: 85
End: 2020-06-16

## 2020-06-16 ENCOUNTER — OFFICE VISIT (OUTPATIENT)
Dept: GERIATRICS | Age: 85
End: 2020-06-16
Payer: MEDICARE

## 2020-06-16 DIAGNOSIS — E03.8 OTHER SPECIFIED HYPOTHYROIDISM: ICD-10-CM

## 2020-06-16 DIAGNOSIS — K59.09 OTHER CONSTIPATION: ICD-10-CM

## 2020-06-16 DIAGNOSIS — R32 URINARY INCONTINENCE, UNSPECIFIED TYPE: ICD-10-CM

## 2020-06-16 DIAGNOSIS — I10 ESSENTIAL HYPERTENSION: ICD-10-CM

## 2020-06-16 DIAGNOSIS — F32.A DEPRESSION, UNSPECIFIED DEPRESSION TYPE: ICD-10-CM

## 2020-06-16 DIAGNOSIS — F03.90 MILD DEMENTIA (HCC): Primary | ICD-10-CM

## 2020-06-16 DIAGNOSIS — R41.3 MEMORY LOSS: Primary | ICD-10-CM

## 2020-06-16 DIAGNOSIS — R26.81 GAIT INSTABILITY: ICD-10-CM

## 2020-06-16 DIAGNOSIS — E03.8 SUBCLINICAL HYPOTHYROIDISM: ICD-10-CM

## 2020-06-16 DIAGNOSIS — Z97.2 WEARS DENTURES: ICD-10-CM

## 2020-06-16 PROCEDURE — 96139 PSYCL/NRPSYC TST TECH EA: CPT | Performed by: STUDENT IN AN ORGANIZED HEALTH CARE EDUCATION/TRAINING PROGRAM

## 2020-06-16 PROCEDURE — 99215 OFFICE O/P EST HI 40 MIN: CPT | Performed by: STUDENT IN AN ORGANIZED HEALTH CARE EDUCATION/TRAINING PROGRAM

## 2020-06-16 PROCEDURE — 96138 PSYCL/NRPSYC TECH 1ST: CPT | Performed by: STUDENT IN AN ORGANIZED HEALTH CARE EDUCATION/TRAINING PROGRAM

## 2020-06-16 RX ORDER — LEVOTHYROXINE SODIUM 0.03 MG/1
25 TABLET ORAL DAILY
Qty: 30 TABLET | Refills: 2 | Status: SHIPPED | OUTPATIENT
Start: 2020-06-16 | End: 2020-08-31 | Stop reason: SDUPTHER

## 2020-06-17 PROBLEM — R26.81 GAIT INSTABILITY: Status: ACTIVE | Noted: 2020-06-17

## 2020-08-31 ENCOUNTER — TELEPHONE (OUTPATIENT)
Dept: GERIATRICS | Age: 85
End: 2020-08-31

## 2020-08-31 DIAGNOSIS — F03.90 MILD DEMENTIA (HCC): ICD-10-CM

## 2020-08-31 DIAGNOSIS — E03.8 OTHER SPECIFIED HYPOTHYROIDISM: ICD-10-CM

## 2020-08-31 RX ORDER — LEVOTHYROXINE SODIUM 0.03 MG/1
25 TABLET ORAL DAILY
Qty: 30 TABLET | Refills: 2 | Status: SHIPPED | OUTPATIENT
Start: 2020-08-31 | End: 2021-01-08

## 2020-08-31 NOTE — TELEPHONE ENCOUNTER
Patient needs refill: levothyroxine 25 mcg tablet   Patient does have several doses remaining    PHARMACY: [facility]

## 2020-09-14 NOTE — PROGRESS NOTES
Cardiology Follow Up    [facility]  1934  [id number]  [address] [facility]  [facility] [address] [facility]   [phone]    1  Abnormal EKG  POCT ECG   2  Essential (primary) hypertension     3  Pure hypercholesterolemia         Interval History: Patient is here for a follow-up visit  His most recent echocardiogram was done in April of 2017 and demonstrated preserved LV systolic function with no significant valvular heart disease and no change compared to a prior study   He had a nuclear stress test done June 2013 which showed no evidence of ischemia  patient has essential hypertension with good control on his current therapy  Patient has mild dementia and is followed by geriatric medicine  Patient has had no chest pain or significant dyspnea  His vital signs are stable today  EKG today demonstrates sinus rhythm with first-degree AV block and right bundle branch block with left axis deviation with no significant change compared to a prior tracing done September 17, 2019 except for some mild lengthening of the DE interval     Patient Active Problem List   Diagnosis    Mild dementia ([address] )    Essential hypertension    Other hyperlipidemia    Other constipation    Urinary incontinence    Depression    Wears dentures    Subclinical hypothyroidism    Gait instability    H/O fall     No past medical history on file    Social History     Socioeconomic History    Marital status: /Civil Union     Spouse name: Not on file    Number of children: Not on file    Years of education: Not on file    Highest education level: Not on file   Occupational History    Not on file   Social Needs    Financial resource strain: Not on file    Food insecurity     Worry: Not on file     Inability: Not on file    Transportation needs     Medical: Not on file     Non-medical: Not on file   Tobacco Use    Smoking status: Former Smoker     Types: Cigarettes     Last attempt to quit:      Years since quittin 7    Smokeless tobacco: Never Used   Substance and Sexual Activity    Alcohol use: Not on file    Drug use: Not on file    Sexual activity: Not on file   Lifestyle    Physical activity     Days per week: Not on file     Minutes per session: Not on file    Stress: Not on file   Relationships    Social connections     Talks on phone: Not on file     Gets together: Not on file     Attends Zoroastrian service: Not on file     Active member of club or organization: Not on file     Attends meetings of clubs or organizations: Not on file     Relationship status: Not on file    Intimate partner violence     Fear of current or ex partner: Not on file     Emotionally abused: Not on file     Physically abused: Not on file     Forced sexual activity: Not on file   Other Topics Concern    Not on file   Social History Narrative    Not on file      No family history on file  No past surgical history on file  Current Outpatient Medications:     amLODIPine (NORVASC) 5 mg tablet, TAKE 1 TABLET BY MOUTH ONCE DAILY, Disp: 90 tablet, Rfl: 3    aspirin 81 mg chewable tablet, Chew 81 mg every other day  , Disp: , Rfl:     Cholecalciferol (VITAMIN D) 2000 units CAPS, Take 1,000 Units by mouth daily , Disp: , Rfl:     divalproex sodium (DEPAKOTE) 125 mg EC tablet, Take 1 tablet (125 mg total) by mouth daily at bedtime, Disp: 30 tablet, Rfl: 1    levothyroxine 25 mcg tablet, Take 1 tablet (25 mcg total) by mouth daily, Disp: 30 tablet, Rfl: 2    Multiple Vitamin (MULTIVITAMIN) capsule, Take 1 capsule by mouth daily, Disp: , Rfl:     terazosin (HYTRIN) 5 mg capsule, Take 5 mg by mouth daily at bedtime, Disp: , Rfl:   No Known Allergies    Labs:not applicable  Imaging: No results found  Review of Systems:  Review of Systems   All other systems reviewed and are negative        Physical Exam:  /82 (BP Location: Right arm, Patient Position: Sitting, Cuff Size: Standard)   Pulse 66   Temp (!) 97 3 °F (36 3 °C)   Ht 6' (1 829 m)   Wt 88 9 kg (196 lb)   BMI 26 58 kg/m²   Physical Exam  Vitals signs reviewed  Constitutional:       Appearance: He is well-developed  HENT:      Head: Normocephalic and atraumatic  Eyes:      Conjunctiva/sclera: Conjunctivae normal       Pupils: Pupils are equal, round, and reactive to light  Neck:      Musculoskeletal: Normal range of motion and neck supple  Cardiovascular:      Rate and Rhythm: Normal rate  Heart sounds: Normal heart sounds  Pulmonary:      Effort: Pulmonary effort is normal       Breath sounds: Normal breath sounds  Skin:     General: Skin is warm and dry  Neurological:      Mental Status: He is alert and oriented to person, place, and time  Discussion/Summary:I will continue the patient's present medical regimen  Patient appears well compensated  I have asked the patient to call if there is a problem in the interim otherwise I will see the patient in one years time

## 2020-09-15 ENCOUNTER — OFFICE VISIT (OUTPATIENT)
Dept: GERIATRICS | Age: 85
End: 2020-09-15
Payer: MEDICARE

## 2020-09-15 VITALS
BODY MASS INDEX: 26.71 KG/M2 | OXYGEN SATURATION: 96 % | SYSTOLIC BLOOD PRESSURE: 122 MMHG | TEMPERATURE: 97.3 F | WEIGHT: 197.2 LBS | DIASTOLIC BLOOD PRESSURE: 66 MMHG | HEART RATE: 65 BPM | HEIGHT: 72 IN | RESPIRATION RATE: 12 BRPM

## 2020-09-15 DIAGNOSIS — F32.A DEPRESSION, UNSPECIFIED DEPRESSION TYPE: ICD-10-CM

## 2020-09-15 DIAGNOSIS — R26.81 GAIT INSTABILITY: ICD-10-CM

## 2020-09-15 DIAGNOSIS — Z91.81 H/O FALL: ICD-10-CM

## 2020-09-15 DIAGNOSIS — F03.90 MILD DEMENTIA (HCC): Primary | ICD-10-CM

## 2020-09-15 DIAGNOSIS — E03.8 SUBCLINICAL HYPOTHYROIDISM: ICD-10-CM

## 2020-09-15 DIAGNOSIS — K59.09 OTHER CONSTIPATION: ICD-10-CM

## 2020-09-15 DIAGNOSIS — T88.7XXA MEDICATION SIDE EFFECT: ICD-10-CM

## 2020-09-15 DIAGNOSIS — E03.8 OTHER SPECIFIED HYPOTHYROIDISM: ICD-10-CM

## 2020-09-15 DIAGNOSIS — I10 ESSENTIAL HYPERTENSION: ICD-10-CM

## 2020-09-15 DIAGNOSIS — R32 URINARY INCONTINENCE, UNSPECIFIED TYPE: ICD-10-CM

## 2020-09-15 PROCEDURE — 99215 OFFICE O/P EST HI 40 MIN: CPT | Performed by: STUDENT IN AN ORGANIZED HEALTH CARE EDUCATION/TRAINING PROGRAM

## 2020-09-15 RX ORDER — DIVALPROEX SODIUM 125 MG/1
125 TABLET, DELAYED RELEASE ORAL
Qty: 30 TABLET | Refills: 1 | Status: SHIPPED | OUTPATIENT
Start: 2020-09-15 | End: 2020-10-28 | Stop reason: DRUGHIGH

## 2020-09-15 NOTE — PATIENT INSTRUCTIONS
1) Start  depakote 125mg orally at nightime tonight  2)Please call the office on Friday ([phone]) to update us on pt's mood  Will decide if to increaseOccurred to 250 mg at nighttime after  Telephone call on Friday  3)Will plan to  discontinue levothyroxine after rechecking TSH  4) Will order CMP,  Depakote level, ammonia in 1 month    Will also repeat TSH at that time

## 2020-09-15 NOTE — PROGRESS NOTES
[facility]  [address] , [address] [facility], [address] [place] [address]     NEUROCOGNITIVE ASSESSMENT FOLLOW-UP      NAME: Sejal Biggs  AGE: 80 y o  SEX: male    DATE OF ENCOUNTER: 9/15/2020    Assessment and Plan     Problem List Items Addressed This Visit        Endocrine    Subclinical hypothyroidism     Recent trial with Synthroid for subclinical hypothyroidism given recent evident placed study showing some improvement in global cognitive scores with Synthroid therapy in subclinical hypothyroidism  No overt changes noted over the past 3 months  Will plan to repeat TSH with reflex T4 and discontinue Synthroid at that time            Cardiovascular and Mediastinum    Essential hypertension     /66  Patient continues on amlodipine 5 mg daily  Continue aspirin 81 mg daily  Recommend adherence to a heart healthy diet  Follow-up with PCP for continued monitoring            Nervous and Auditory    Mild dementia [address] [address] ) - Primary     Patient with progressive memory loss and persisting mood changes  Of note, the patient remains very argumentative with family and spouse as he becomes in patient with wife's forgetfulness given her dementia  No overt cognitive improvement in light of recent trial of levothyroxine for 3 months with recent evidence based studies showing some improvement in cognition in treating subclinical hypothyroidism  Will repeat TSH in reflex T4 and plan to discontinue levothyroxine thereafter  Will start Depakote 125 mg p o   HS  Discussed side effect profile of Depakote  Will order CMP, ammonia and Depakote level in 1 month  Family to call the office in 4 days to determine any changes in mood and personality since starting therapy with Depakote  Will refer to speech therapy for cognitive rehabilitation  Discussed with family about the importance of obtaining additional home health services as patient's son and daughter in law are high risk for caregiver burnout  Recommend reorientation and redirection as needed  Manage chronic conditions  Maintain Falls precautions  Encourage patient remain active mentally, physically and socially  Patient should participate in cognitively challenging exercises as able  Will follow-up for telephone visit in 1 month               Relevant Orders    Valproic acid level, total       Other    Other constipation     Encourage increased fiber intake  Physical activity as able  Will continue to follow         Urinary incontinence     Recommend scheduled toileting every 2 hours once awake  Avoid fluid intake 2 hours prior to bedtime to avoid nocturnal enuresis         Depression     Patient with episodes of temper outburst, argumentative with spouse and family members  Patient denies any homicidal suicidal ideation  Encourage patient remain active, mentally, physically and socially  No improvement in symptoms with trial of levothyroxine  Will plan to discontinue Synthroid after repeat TSH  Will trial Depakote 125 mg p o  HS for mood stabilization given its of label use in patients with dementia  Encouraged patient remain active mentally, physically and socially         Gait instability     Patient has sustained 2 falls in the past few months  Typically loses balance on uneven ground  Will refer to physical therapy for gait training, balance and strengthening  Maintain Falls precautions         H/O fall     Maintain Falls precautions  Will refer to physical therapy for gait training, balance and strengthening  Continue vitamin-D supplementation daily           Other Visit Diagnoses     Medication side effect        Relevant Medications    divalproex sodium (DEPAKOTE) 125 mg EC tablet    Other Relevant Orders    Comprehensive metabolic panel    Ammonia    Valproic acid level, total    Other specified hypothyroidism        Relevant Orders    TSH, 3rd generation with T4 reflex            - Counseling Documentation: patient was counseled regarding: instructions for management, risk factor reductions, prognosis, patient and family education, impressions, risks and benefits of treatment options and importance of compliance with treatment    Chief Complaint     Patient presents for follow-up of dementia with behaviors    History of Present Illness     HPI    Patient presents today with his wife and daughter-in-law in the office for follow-up of his dementia with behaviors  His son who is his primary caretaker is present via video  Family explains that the patient continues to have episodes of agitation where he becomes very argumentative with his wife, son and daughter-in-law  Questionable hallucinations as patient recently was fearful that his son was going to be hurt or killed when his son decided to go on 5 days vacation  Due to patient's impatience in dealing and coping with his wife severe dementia, he often ends up triggering her own behaviors by being argumentative with her  Today the patient explains that he feels frustrated that he is forgetful and he does not have control over his memory  He remains independent in some ADLs however family states that he continues to now have persisting urinary incontinence  Daughter in law explains that she recently noticed urine on the floors of her home and the patient's clothing  He is able to continue preparing some meals for himself and his wife and has not been involved in any hazardous safety situations at home such as leaving the stove on  The patient's son and daughter-in-law are  overwhelmed however they have not yet been able to get additional home health aide services in  Patient's granddaughter typically helps during the week for 2 days  No significant improvement in cognition noted since trialing use of Synthroid per evidence based studies in treatment of subclinical hypothyroidism in patients with cognitive deficits  The patient does have gait instability and did sustain 2 falls per family    He typically loses his balance on uneven ground when he is outdoors and denies any prodromal symptoms  He does have a history of chronic constipation which is controlled with a high-fiber diet  He continues on vitamin-D supplementation for a history of vitamin-D deficiency  He has been compliant with amlodipine and aspirin for a history of HTN  He continues on Hytrin for BPH with persisting symptoms of urinary incontinence        The following portions of the patient's history were reviewed and updated as appropriate: allergies, current medications, past family history, past medical history, past social history, past surgical history and problem list     Review of Systems     Review of Systems   Constitutional: Positive for activity change and fatigue  Negative for chills and fever  HENT: Negative for congestion, ear pain, rhinorrhea and sore throat  Eyes: Negative for discharge  Respiratory: Negative for cough, chest tightness, shortness of breath, wheezing and stridor  Cardiovascular: Negative for chest pain, palpitations and leg swelling  Gastrointestinal: Positive for constipation  Negative for abdominal pain, diarrhea, nausea and vomiting  Genitourinary: Negative for decreased urine volume and dysuria  Urinary incontinence   Musculoskeletal: Positive for arthralgias (Chronic) and gait problem  Skin: Negative for color change, pallor, rash and wound  Neurological: Positive for weakness  Negative for dizziness and light-headedness  Psychiatric/Behavioral: Positive for agitation, behavioral problems, confusion, decreased concentration and hallucinations (Questionable)  Negative for dysphoric mood and sleep disturbance         Active Problem List     Patient Active Problem List   Diagnosis    Mild dementia ([address] )    Essential hypertension    Other hyperlipidemia    Other constipation    Urinary incontinence    Depression    Wears dentures    Subclinical hypothyroidism    Gait instability  H/O fall       Objective     /66 (BP Location: Right arm, Patient Position: Sitting, Cuff Size: Adult)   Pulse 65   Temp (!) 97 3 °F (36 3 °C) (Temporal)   Resp 12   Ht 6' (1 829 m) Comment: with shoes  Wt 89 4 kg (197 lb 3 2 oz) Comment: with shoes  SpO2 96%   BMI 26 75 kg/m²     Physical Exam  Vitals signs reviewed  Constitutional:       General: He is not in acute distress  Appearance: Normal appearance  He is well-developed  He is not diaphoretic  Comments: Elderly male sitting comfortably in chair in no obvious cardiorespiratory or painful distress   HENT:      Head: Normocephalic and atraumatic  Right Ear: Tympanic membrane and external ear normal       Left Ear: Tympanic membrane and external ear normal       Nose: Nose normal       Mouth/Throat:      Mouth: Mucous membranes are moist       Pharynx: Oropharynx is clear  No oropharyngeal exudate  Eyes:      General: No scleral icterus  Right eye: No discharge  Left eye: No discharge  Conjunctiva/sclera: Conjunctivae normal    Neck:      Musculoskeletal: Normal range of motion and neck supple  Vascular: No JVD  Cardiovascular:      Rate and Rhythm: Normal rate and regular rhythm  Heart sounds: Murmur (ESM 2/6) present  No friction rub  No gallop  Pulmonary:      Effort: Pulmonary effort is normal  No respiratory distress  Breath sounds: Normal breath sounds  No wheezing or rales  Abdominal:      General: Bowel sounds are normal  There is no distension  Palpations: Abdomen is soft  Tenderness: There is no abdominal tenderness  There is no guarding  Musculoskeletal: Normal range of motion  General: No tenderness or deformity  Skin:     General: Skin is warm  Coloration: Skin is not pale  Findings: No erythema or rash  Neurological:      General: No focal deficit present  Mental Status: He is alert  Mental status is at baseline        Gait: Gait abnormal       Deep Tendon Reflexes: Reflexes are normal and symmetric  Comments: Oriented to self only  Moving all limbs  Follows commands readily   Psychiatric:         Mood and Affect: Mood normal          Pertinent Laboratory/Diagnostic Studies:  Reviewed prior blood work  Will order CMP, ammonia, Depakote level, TSH with reflex T4 in 1 month    Current Medications     Current Outpatient Medications:     amLODIPine (NORVASC) 5 mg tablet, TAKE 1 TABLET BY MOUTH ONCE DAILY, Disp: 90 tablet, Rfl: 3    aspirin 81 mg chewable tablet, Chew 81 mg every other day  , Disp: , Rfl:     Cholecalciferol (VITAMIN D) 2000 units CAPS, Take 1,000 Units by mouth daily , Disp: , Rfl:     levothyroxine 25 mcg tablet, Take 1 tablet (25 mcg total) by mouth daily, Disp: 30 tablet, Rfl: 2    Multiple Vitamin (MULTIVITAMIN) capsule, Take 1 capsule by mouth daily, Disp: , Rfl:     terazosin (HYTRIN) 5 mg capsule, Take 5 mg by mouth daily at bedtime, Disp: , Rfl:     divalproex sodium (DEPAKOTE) 125 mg EC tablet, Take 1 tablet (125 mg total) by mouth daily at bedtime, Disp: 30 tablet, Rfl: 1    Health Maintenance     Health Maintenance   Topic Date Due    Medicare Annual Wellness Visit (AWV)  1934    BMI: Followup Plan  07/06/1952    DTaP,Tdap,and Td Vaccines (1 - Tdap) 07/06/1955    Fall Risk  07/06/1999    Influenza Vaccine  07/01/2020    BMI: Adult  09/15/2021    Pneumococcal Vaccine: 65+ Years  Completed    HIB Vaccine  Aged Out    Hepatitis B Vaccine  Aged Out    IPV Vaccine  Aged Out    Hepatitis A Vaccine  Aged Out    Meningococcal ACWY Vaccine  Aged Out    HPV Vaccine  Aged Out       There is no immunization history on file for this patient      Ramos Miller MD  Geriatrics   9/15/2020 11:06 PM

## 2020-09-16 NOTE — ASSESSMENT & PLAN NOTE
Recent trial with Synthroid for subclinical hypothyroidism given recent evident placed study showing some improvement in global cognitive scores with Synthroid therapy in subclinical hypothyroidism  No overt changes noted over the past 3 months    Will plan to repeat TSH with reflex T4 and discontinue Synthroid at that time

## 2020-09-16 NOTE — ASSESSMENT & PLAN NOTE
Patient has sustained 2 falls in the past few months  Typically loses balance on uneven ground  Will refer to physical therapy for gait training, balance and strengthening  Maintain Falls precautions

## 2020-09-16 NOTE — ASSESSMENT & PLAN NOTE
Patient with episodes of temper outburst, argumentative with spouse and family members  Patient denies any homicidal suicidal ideation  Encourage patient remain active, mentally, physically and socially  No improvement in symptoms with trial of levothyroxine  Will plan to discontinue Synthroid after repeat TSH  Will trial Depakote 125 mg p o  HS for mood stabilization given its of label use in patients with dementia  Encouraged patient remain active mentally, physically and socially

## 2020-09-16 NOTE — ASSESSMENT & PLAN NOTE
Maintain Falls precautions  Will refer to physical therapy for gait training, balance and strengthening  Continue vitamin-D supplementation daily

## 2020-09-16 NOTE — ASSESSMENT & PLAN NOTE
Recommend scheduled toileting every 2 hours once awake  Avoid fluid intake 2 hours prior to bedtime to avoid nocturnal enuresis

## 2020-09-16 NOTE — ASSESSMENT & PLAN NOTE
/66  Patient continues on amlodipine 5 mg daily  Continue aspirin 81 mg daily  Recommend adherence to a heart healthy diet  Follow-up with PCP for continued monitoring

## 2020-09-16 NOTE — ASSESSMENT & PLAN NOTE
Patient with progressive memory loss and persisting mood changes  Of note, the patient remains very argumentative with family and spouse as he becomes in patient with wife's forgetfulness given her dementia  No overt cognitive improvement in light of recent trial of levothyroxine for 3 months with recent evidence based studies showing some improvement in cognition in treating subclinical hypothyroidism  Will repeat TSH in reflex T4 and plan to discontinue levothyroxine thereafter  Will start Depakote 125 mg p o   HS  Discussed side effect profile of Depakote  Will order CMP, ammonia and Depakote level in 1 month  Family to call the office in 4 days to determine any changes in mood and personality since starting therapy with Depakote  Will refer to speech therapy for cognitive rehabilitation  Discussed with family about the importance of obtaining additional home health services as patient's son and daughter in law are high risk for caregiver burnout  Recommend reorientation and redirection as needed  Manage chronic conditions  Maintain Falls precautions  Encourage patient remain active mentally, physically and socially  Patient should participate in cognitively challenging exercises as able  Will follow-up for telephone visit in 1 month

## 2020-09-17 ENCOUNTER — OFFICE VISIT (OUTPATIENT)
Dept: CARDIOLOGY CLINIC | Facility: CLINIC | Age: 85
End: 2020-09-17
Payer: MEDICARE

## 2020-09-17 VITALS
DIASTOLIC BLOOD PRESSURE: 82 MMHG | SYSTOLIC BLOOD PRESSURE: 130 MMHG | HEIGHT: 72 IN | TEMPERATURE: 97.3 F | WEIGHT: 196 LBS | HEART RATE: 66 BPM | BODY MASS INDEX: 26.55 KG/M2

## 2020-09-17 DIAGNOSIS — E78.00 PURE HYPERCHOLESTEROLEMIA: ICD-10-CM

## 2020-09-17 DIAGNOSIS — I10 ESSENTIAL (PRIMARY) HYPERTENSION: ICD-10-CM

## 2020-09-17 DIAGNOSIS — R94.31 ABNORMAL EKG: Primary | ICD-10-CM

## 2020-09-17 PROCEDURE — 93000 ELECTROCARDIOGRAM COMPLETE: CPT | Performed by: INTERNAL MEDICINE

## 2020-09-17 PROCEDURE — 99214 OFFICE O/P EST MOD 30 MIN: CPT | Performed by: INTERNAL MEDICINE

## 2020-09-30 ENCOUNTER — APPOINTMENT (OUTPATIENT)
Dept: LAB | Facility: HOSPITAL | Age: 85
End: 2020-09-30
Attending: STUDENT IN AN ORGANIZED HEALTH CARE EDUCATION/TRAINING PROGRAM
Payer: MEDICARE

## 2020-09-30 DIAGNOSIS — E03.8 OTHER SPECIFIED HYPOTHYROIDISM: ICD-10-CM

## 2020-09-30 DIAGNOSIS — T88.7XXA MEDICATION SIDE EFFECT: ICD-10-CM

## 2020-09-30 DIAGNOSIS — F03.90 MILD DEMENTIA (HCC): ICD-10-CM

## 2020-09-30 LAB
ALBUMIN SERPL BCP-MCNC: 3.8 G/DL (ref 3.5–5)
ALP SERPL-CCNC: 72 U/L (ref 46–116)
ALT SERPL W P-5'-P-CCNC: 22 U/L (ref 12–78)
AMMONIA PLAS-SCNC: <10 UMOL/L (ref 11–35)
ANION GAP SERPL CALCULATED.3IONS-SCNC: -1 MMOL/L (ref 4–13)
AST SERPL W P-5'-P-CCNC: 11 U/L (ref 5–45)
BILIRUB SERPL-MCNC: 0.75 MG/DL (ref 0.2–1)
BUN SERPL-MCNC: 19 MG/DL (ref 5–25)
CALCIUM SERPL-MCNC: 10.7 MG/DL (ref 8.3–10.1)
CHLORIDE SERPL-SCNC: 109 MMOL/L (ref 100–108)
CO2 SERPL-SCNC: 32 MMOL/L (ref 21–32)
CREAT SERPL-MCNC: 1.24 MG/DL (ref 0.6–1.3)
GFR SERPL CREATININE-BSD FRML MDRD: 52 ML/MIN/1.73SQ M
GLUCOSE SERPL-MCNC: 86 MG/DL (ref 65–140)
POTASSIUM SERPL-SCNC: 4.8 MMOL/L (ref 3.5–5.3)
PROT SERPL-MCNC: 8.1 G/DL (ref 6.4–8.2)
SODIUM SERPL-SCNC: 140 MMOL/L (ref 136–145)
TSH SERPL DL<=0.05 MIU/L-ACNC: 3.26 UIU/ML (ref 0.36–3.74)
VALPROATE SERPL-MCNC: 7 UG/ML (ref 50–100)

## 2020-09-30 PROCEDURE — 84443 ASSAY THYROID STIM HORMONE: CPT

## 2020-09-30 PROCEDURE — 80053 COMPREHEN METABOLIC PANEL: CPT

## 2020-09-30 PROCEDURE — 82140 ASSAY OF AMMONIA: CPT

## 2020-09-30 PROCEDURE — 80164 ASSAY DIPROPYLACETIC ACD TOT: CPT

## 2020-09-30 PROCEDURE — 36415 COLL VENOUS BLD VENIPUNCTURE: CPT

## 2020-10-27 DIAGNOSIS — T88.7XXA MEDICATION SIDE EFFECT: ICD-10-CM

## 2020-10-28 DIAGNOSIS — F03.90 MILD DEMENTIA (HCC): Primary | ICD-10-CM

## 2020-10-28 RX ORDER — DIVALPROEX SODIUM 125 MG/1
125 TABLET, DELAYED RELEASE ORAL
Qty: 30 TABLET | Refills: 0 | OUTPATIENT
Start: 2020-10-28

## 2020-10-28 RX ORDER — DIVALPROEX SODIUM 250 MG/1
250 TABLET, DELAYED RELEASE ORAL
Qty: 30 TABLET | Refills: 2 | Status: SHIPPED | OUTPATIENT
Start: 2020-10-28 | End: 2021-02-01 | Stop reason: SDUPTHER

## 2020-11-02 ENCOUNTER — TELEPHONE (OUTPATIENT)
Dept: GERIATRICS | Age: 85
End: 2020-11-02

## 2021-01-08 ENCOUNTER — HOSPITAL ENCOUNTER (EMERGENCY)
Facility: HOSPITAL | Age: 86
Discharge: HOME | End: 2021-01-08
Attending: EMERGENCY MEDICINE | Admitting: EMERGENCY MEDICINE
Payer: MEDICARE

## 2021-01-08 ENCOUNTER — APPOINTMENT (EMERGENCY)
Dept: RADIOLOGY | Facility: HOSPITAL | Age: 86
End: 2021-01-08
Payer: MEDICARE

## 2021-01-08 VITALS
TEMPERATURE: 98.3 F | HEART RATE: 80 BPM | SYSTOLIC BLOOD PRESSURE: 143 MMHG | DIASTOLIC BLOOD PRESSURE: 77 MMHG | RESPIRATION RATE: 24 BRPM | OXYGEN SATURATION: 97 %
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
## 2021-01-08 DIAGNOSIS — S09.90XA INJURY OF HEAD, INITIAL ENCOUNTER: ICD-10-CM

## 2021-01-08 DIAGNOSIS — N39.0 UTI (URINARY TRACT INFECTION): Primary | ICD-10-CM

## 2021-01-08 LAB
ALBUMIN SERPL BCP-MCNC: 3.3 G/DL (ref 3.5–5)
ALP SERPL-CCNC: 62 U/L (ref 46–116)
ALT SERPL W P-5'-P-CCNC: 23 U/L (ref 12–78)
ANION GAP SERPL CALCULATED.3IONS-SCNC: 7 MMOL/L (ref 4–13)
AST SERPL W P-5'-P-CCNC: 18 U/L (ref 5–45)
BACTERIA UR QL AUTO: ABNORMAL /HPF
BASOPHILS # BLD AUTO: 0.09 THOUSANDS/ΜL (ref 0–0.1)
BASOPHILS NFR BLD AUTO: 1 % (ref 0–1)
BILIRUB SERPL-MCNC: 0.6 MG/DL (ref 0.2–1)
BILIRUB UR QL STRIP: NEGATIVE
BUN SERPL-MCNC: 17 MG/DL (ref 5–25)
CALCIUM ALBUM COR SERPL-MCNC: 9 MG/DL (ref 8.3–10.1)
CALCIUM SERPL-MCNC: 8.4 MG/DL (ref 8.3–10.1)
CHLORIDE SERPL-SCNC: 100 MMOL/L (ref 100–108)
CLARITY UR: ABNORMAL
CO2 SERPL-SCNC: 26 MMOL/L (ref 21–32)
COLOR UR: YELLOW
CREAT SERPL-MCNC: 1.21 MG/DL (ref 0.6–1.3)
EOSINOPHIL # BLD AUTO: 0.03 THOUSAND/ΜL (ref 0–0.61)
EOSINOPHIL NFR BLD AUTO: 0 % (ref 0–6)
ERYTHROCYTE [DISTWIDTH] IN BLOOD BY AUTOMATED COUNT: 12.6 % (ref 11.6–15.1)
GFR SERPL CREATININE-BSD FRML MDRD: 54 ML/MIN/1.73SQ M
GLUCOSE SERPL-MCNC: 102 MG/DL (ref 65–140)
GLUCOSE UR STRIP-MCNC: NEGATIVE MG/DL
HCT VFR BLD AUTO: 44.8 % (ref 36.5–49.3)
HGB BLD-MCNC: 14.7 G/DL (ref 12–17)
HGB UR QL STRIP.AUTO: ABNORMAL
IMM GRANULOCYTES # BLD AUTO: 0.11 THOUSAND/UL (ref 0–0.2)
IMM GRANULOCYTES NFR BLD AUTO: 1 % (ref 0–2)
KETONES UR STRIP-MCNC: ABNORMAL MG/DL
LACTATE SERPL-SCNC: 1.9 MMOL/L (ref 0.5–2)
LEUKOCYTE ESTERASE UR QL STRIP: NEGATIVE
LIPASE SERPL-CCNC: 78 U/L (ref 73–393)
LYMPHOCYTES # BLD AUTO: 0.9 THOUSANDS/ΜL (ref 0.6–4.47)
LYMPHOCYTES NFR BLD AUTO: 6 % (ref 14–44)
MCH RBC QN AUTO: 31.7 PG (ref 26.8–34.3)
MCHC RBC AUTO-ENTMCNC: 32.8 G/DL (ref 31.4–37.4)
MCV RBC AUTO: 97 FL (ref 82–98)
MONOCYTES # BLD AUTO: 1.99 THOUSAND/ΜL (ref 0.17–1.22)
MONOCYTES NFR BLD AUTO: 12 % (ref 4–12)
MUCOUS THREADS UR QL AUTO: ABNORMAL
NEUTROPHILS # BLD AUTO: 13.29 THOUSANDS/ΜL (ref 1.85–7.62)
NEUTS SEG NFR BLD AUTO: 80 % (ref 43–75)
NITRITE UR QL STRIP: NEGATIVE
NON-SQ EPI CELLS URNS QL MICRO: ABNORMAL /HPF
NRBC BLD AUTO-RTO: 0 /100 WBCS
PH UR STRIP.AUTO: 5.5 [PH]
PLATELET # BLD AUTO: 262 THOUSANDS/UL (ref 149–390)
PMV BLD AUTO: 8.7 FL (ref 8.9–12.7)
POTASSIUM SERPL-SCNC: 4.2 MMOL/L (ref 3.5–5.3)
PROT SERPL-MCNC: 7.8 G/DL (ref 6.4–8.2)
PROT UR STRIP-MCNC: ABNORMAL MG/DL
RBC # BLD AUTO: 4.64 MILLION/UL (ref 3.88–5.62)
RBC #/AREA URNS AUTO: ABNORMAL /HPF
SODIUM SERPL-SCNC: 133 MMOL/L (ref 136–145)
SP GR UR STRIP.AUTO: >=1.03 (ref 1–1.03)
TROPONIN I SERPL-MCNC: <0.02 NG/ML
UROBILINOGEN UR QL STRIP.AUTO: 0.2 E.U./DL
WBC # BLD AUTO: 16.41 THOUSAND/UL (ref 4.31–10.16)
WBC #/AREA URNS AUTO: ABNORMAL /HPF

## 2021-01-08 PROCEDURE — 99284 EMERGENCY DEPT VISIT MOD MDM: CPT

## 2021-01-08 PROCEDURE — 72125 CT NECK SPINE W/O DYE: CPT

## 2021-01-08 PROCEDURE — 83690 ASSAY OF LIPASE: CPT | Performed by: EMERGENCY MEDICINE

## 2021-01-08 PROCEDURE — 96361 HYDRATE IV INFUSION ADD-ON: CPT

## 2021-01-08 PROCEDURE — 36415 COLL VENOUS BLD VENIPUNCTURE: CPT | Performed by: EMERGENCY MEDICINE

## 2021-01-08 PROCEDURE — 83605 ASSAY OF LACTIC ACID: CPT | Performed by: EMERGENCY MEDICINE

## 2021-01-08 PROCEDURE — 70450 CT HEAD/BRAIN W/O DYE: CPT

## 2021-01-08 PROCEDURE — 84484 ASSAY OF TROPONIN QUANT: CPT | Performed by: EMERGENCY MEDICINE

## 2021-01-08 PROCEDURE — 93005 ELECTROCARDIOGRAM TRACING: CPT

## 2021-01-08 PROCEDURE — G1004 CDSM NDSC: HCPCS

## 2021-01-08 PROCEDURE — 80053 COMPREHEN METABOLIC PANEL: CPT | Performed by: EMERGENCY MEDICINE

## 2021-01-08 PROCEDURE — 87040 BLOOD CULTURE FOR BACTERIA: CPT | Performed by: EMERGENCY MEDICINE

## 2021-01-08 PROCEDURE — 87086 URINE CULTURE/COLONY COUNT: CPT | Performed by: EMERGENCY MEDICINE

## 2021-01-08 PROCEDURE — 81001 URINALYSIS AUTO W/SCOPE: CPT | Performed by: EMERGENCY MEDICINE

## 2021-01-08 PROCEDURE — 96365 THER/PROPH/DIAG IV INF INIT: CPT

## 2021-01-08 PROCEDURE — 85025 COMPLETE CBC W/AUTO DIFF WBC: CPT | Performed by: EMERGENCY MEDICINE

## 2021-01-08 PROCEDURE — 71045 X-RAY EXAM CHEST 1 VIEW: CPT

## 2021-01-08 PROCEDURE — 99285 EMERGENCY DEPT VISIT HI MDM: CPT | Performed by: EMERGENCY MEDICINE

## 2021-01-08 RX ORDER — CEFTRIAXONE 1 G/50ML
1000 INJECTION, SOLUTION INTRAVENOUS ONCE
Status: COMPLETED | OUTPATIENT
Start: 2021-01-08 | End: 2021-01-08

## 2021-01-08 RX ADMIN — CEFTRIAXONE 1000 MG: 1 INJECTION, SOLUTION INTRAVENOUS at 20:47

## 2021-01-08 RX ADMIN — SODIUM CHLORIDE 1000 ML: 0.9 INJECTION, SOLUTION INTRAVENOUS at 19:15

## 2021-01-09 NOTE — ED PROVIDER NOTES
History  Chief Complaint   Patient presents with   John Flower Fall     as per family, patient was confused this morning, much like when had a UTI in the past  Patient had an unwitnessed fall  Takes baby ASA every other day  Patient presents for evaluation of confusion starting this morning  Patient has had similar symptoms with UTI in the past  Given dose of Levaquin this morning and one dose of Keflex tonight  Family went to the store and came back about 1 hour later to find him on the floor  Unsure of how he fell  Patient denies and pain after the fall  No known COVID exposure  No cough or SOB  History provided by:  Patient and relative   used: No    Fall  Associated symptoms: no abdominal pain, no back pain, no chest pain, no nausea, no neck pain and no vomiting        Prior to Admission Medications   Prescriptions Last Dose Informant Patient Reported? Taking? Cholecalciferol (VITAMIN D) 2000 units CAPS 1/7/2021 at Unknown time Self Yes Yes   Sig: Take 1,000 Units by mouth daily    amLODIPine (NORVASC) 5 mg tablet 1/7/2021 at Unknown time Self No Yes   Sig: TAKE 1 TABLET BY MOUTH ONCE DAILY   aspirin 81 mg chewable tablet 1/7/2021 at Unknown time Self Yes Yes   Sig: Chew 81 mg every other day     divalproex sodium (DEPAKOTE) 250 mg EC tablet 1/7/2021 at Unknown time  No Yes   Sig: Take 1 tablet (250 mg total) by mouth daily at bedtime   terazosin (HYTRIN) 5 mg capsule 1/7/2021 at Unknown time Self Yes Yes   Sig: Take 5 mg by mouth daily at bedtime      Facility-Administered Medications: None       History reviewed  No pertinent past medical history  History reviewed  No pertinent surgical history  History reviewed  No pertinent family history  I have reviewed and agree with the history as documented      E-Cigarette/Vaping    E-Cigarette Use Never User      E-Cigarette/Vaping Substances    Nicotine No     THC No     CBD No     Flavoring No     Other No     Unknown No Social History     Tobacco Use    Smoking status: Former Smoker     Types: Cigarettes     Quit date:      Years since quittin 0    Smokeless tobacco: Never Used   Substance Use Topics    Alcohol use: Not Currently    Drug use: Never       Review of Systems   Constitutional: Negative for chills and fever  HENT: Negative for congestion and sore throat  Respiratory: Negative for cough and shortness of breath  Cardiovascular: Negative for chest pain  Gastrointestinal: Negative for abdominal pain, nausea and vomiting  Genitourinary: Negative for difficulty urinating and dysuria  Musculoskeletal: Negative for back pain and neck pain  Neurological: Negative for weakness and numbness  Psychiatric/Behavioral: Positive for confusion  All other systems reviewed and are negative  Physical Exam  Physical Exam  Vitals signs and nursing note reviewed  Constitutional:       Appearance: Normal appearance  HENT:      Head: Atraumatic  Mouth/Throat:      Mouth: Mucous membranes are moist       Pharynx: Oropharynx is clear  No oropharyngeal exudate  Eyes:      Extraocular Movements: Extraocular movements intact  Pupils: Pupils are equal, round, and reactive to light  Neck:      Musculoskeletal: Normal range of motion and neck supple  Cardiovascular:      Rate and Rhythm: Normal rate and regular rhythm  Pulses: Normal pulses  Pulmonary:      Effort: Pulmonary effort is normal  No respiratory distress  Breath sounds: Normal breath sounds  No wheezing or rales  Abdominal:      General: Abdomen is flat  Bowel sounds are normal  There is no distension  Palpations: Abdomen is soft  Tenderness: There is no abdominal tenderness  Musculoskeletal: Normal range of motion  Right lower leg: No edema  Left lower leg: No edema  Skin:     Capillary Refill: Capillary refill takes less than 2 seconds     Neurological:      General: No focal deficit present  Mental Status: He is alert  Cranial Nerves: No cranial nerve deficit  Sensory: No sensory deficit  Motor: No weakness  Vital Signs  ED Triage Vitals [01/08/21 1849]   Temperature Pulse Respirations Blood Pressure SpO2   98 3 °F (36 8 °C) 90 18 157/77 96 %      Temp Source Heart Rate Source Patient Position - Orthostatic VS BP Location FiO2 (%)   Tympanic Monitor -- -- --      Pain Score       --           Vitals:    01/08/21 1849 01/08/21 2015   BP: 157/77 143/77   Pulse: 90 80         Visual Acuity      ED Medications  Medications   sodium chloride 0 9 % bolus 1,000 mL (0 mL Intravenous Stopped 1/8/21 2046)   cefTRIAXone (ROCEPHIN) IVPB (premix in dextrose) 1,000 mg 50 mL (0 mg Intravenous Stopped 1/8/21 2108)       Diagnostic Studies  Results Reviewed     Procedure Component Value Units Date/Time    Urine culture [[id number]] Collected: 01/08/21 1914    Lab Status: In process Specimen: Urine, Clean Catch Updated: 01/08/21 2051    Troponin I [[id number]]  (Normal) Collected: 01/08/21 1913    Lab Status: Final result Specimen: Blood from Arm, Left Updated: 01/08/21 1946     Troponin I <0 02 ng/mL     Lactic acid [[id number]]  (Normal) Collected: 01/08/21 1913    Lab Status: Final result Specimen: Blood from Arm, Left Updated: 01/08/21 1946     LACTIC ACID 1 9 mmol/L     Narrative:      Result may be elevated if tourniquet was used during collection      Comprehensive metabolic panel [[id number]]  (Abnormal) Collected: 01/08/21 1913    Lab Status: Final result Specimen: Blood from Arm, Left Updated: 01/08/21 1941     Sodium 133 mmol/L      Potassium 4 2 mmol/L      Chloride 100 mmol/L      CO2 26 mmol/L      ANION GAP 7 mmol/L      BUN 17 mg/dL      Creatinine 1 21 mg/dL      Glucose 102 mg/dL      Calcium 8 4 mg/dL      Corrected Calcium 9 0 mg/dL      AST 18 U/L      ALT 23 U/L      Alkaline Phosphatase 62 U/L      Total Protein 7 8 g/dL      Albumin 3 3 g/dL      Total Bilirubin 0 60 mg/dL      eGFR 54 ml/min/1 73sq m     Narrative:      [place] guidelines for Chronic Kidney Disease (CKD):     Stage 1 with normal or high GFR (GFR > 90 mL/min/1 73 square meters)    Stage 2 Mild CKD (GFR = 60-89 mL/min/1 73 square meters)    Stage 3A Moderate CKD (GFR = 45-59 mL/min/1 73 square meters)    Stage 3B Moderate CKD (GFR = 30-44 mL/min/1 73 square meters)    Stage 4 Severe CKD (GFR = 15-29 mL/min/1 73 square meters)    Stage 5 End Stage CKD (GFR <15 mL/min/1 73 square meters)  Note: GFR calculation is accurate only with a steady state creatinine    Lipase [[id number]]  (Normal) Collected: 01/08/21 1913    Lab Status: Final result Specimen: Blood from Arm, Left Updated: 01/08/21 1941     Lipase 78 u/L     Urine Microscopic [[id number]]  (Abnormal) Collected: 01/08/21 1914    Lab Status: Final result Specimen: Urine, Clean Catch Updated: 01/08/21 1931     RBC, UA 0-1 /hpf      WBC, UA 20-30 /hpf      Epithelial Cells Occasional /hpf      Bacteria, UA Occasional /hpf      MUCUS THREADS Occasional    Blood culture #2 [[id number]] Collected: 01/08/21 1921    Lab Status:  In process Specimen: Blood from Arm, Right Updated: 01/08/21 1925    CBC and differential [[id number]]  (Abnormal) Collected: 01/08/21 1913    Lab Status: Final result Specimen: Blood from Arm, Left Updated: 01/08/21 1924     WBC 16 41 Thousand/uL      RBC 4 64 Million/uL      Hemoglobin 14 7 g/dL      Hematocrit 44 8 %      MCV 97 fL      MCH 31 7 pg      MCHC 32 8 g/dL      RDW 12 6 %      MPV 8 7 fL      Platelets 731 Thousands/uL      nRBC 0 /100 WBCs      Neutrophils Relative 80 %      Immat GRANS % 1 %      Lymphocytes Relative 6 %      Monocytes Relative 12 %      Eosinophils Relative 0 %      Basophils Relative 1 %      Neutrophils Absolute 13 29 Thousands/µL      Immature Grans Absolute 0 11 Thousand/uL      Lymphocytes Absolute 0 90 Thousands/µL      Monocytes Absolute 1 99 Thousand/µL Eosinophils Absolute 0 03 Thousand/µL      Basophils Absolute 0 09 Thousands/µL     UA (URINE) with reflex to Scope [[id number]]  (Abnormal) Collected: 01/08/21 1914    Lab Status: Final result Specimen: Urine, Clean Catch Updated: 01/08/21 1921     Color, UA Yellow     Clarity, UA Slightly Cloudy     Specific Gravity, UA >=1 030     pH, UA 5 5     Leukocytes, UA Negative     Nitrite, UA Negative     Protein,  (2+) mg/dl      Glucose, UA Negative mg/dl      Ketones, UA Trace mg/dl      Urobilinogen, UA 0 2 E U /dl      Bilirubin, UA Negative     Blood, UA Trace-Intact    Blood culture #1 [[id number]] Collected: 01/08/21 1913    Lab Status: In process Specimen: Blood from Arm, Left Updated: 01/08/21 1919                 CT cervical spine without contrast   Final Result by Staci Montiel MD (01/08 2022)      No cervical spine fracture or traumatic malalignment  Workstation performed: [id number]         CT head without contrast   Final Result by Staci Montiel MD (01/08 2015)      No acute intracranial abnormality  Workstation performed: [id number]         XR chest 1 view portable    (Results Pending)              Procedures  Procedures         ED Course                                           MDM  Number of Diagnoses or Management Options  Fall, initial encounter:   Injury of head, initial encounter:   UTI (urinary tract infection):   Diagnosis management comments: Pulse ox 97% on RA indicating adequate oxygenation  CXR: NAD as read by me    Given dose of abx in the ER for suspected UTI and will continue with oral abx at home return if symptoms worsen         Amount and/or Complexity of Data Reviewed  Clinical lab tests: ordered and reviewed  Tests in the radiology section of CPT®: ordered and reviewed  Decide to obtain previous medical records or to obtain history from someone other than the patient: yes  Review and summarize past medical records: yes    Patient Progress  Patient progress: stable      Disposition  Final diagnoses:   UTI (urinary tract infection)   Injury of head, initial encounter   Fall, initial encounter     Time reflects when diagnosis was documented in both MDM as applicable and the Disposition within this note     Time User Action Codes Description Comment    1/8/2021  9:09 PM Christin Shaw Mary Ariasaudrey [N39 0] UTI (urinary tract infection)     1/8/2021  9:09 PM Oli Shaw [[id number]] Injury of head, initial encounter     1/8/2021  9:09 PM Tawanda Harp Add Haque Conception  Kike Mahmoodbriana, initial encounter       ED Disposition     ED Disposition Condition Date/Time Comment    Discharge Stable Fri Jan 8, 2021  9:09 PM Angie Bo discharge to home/self care  Follow-up Information     Follow up With Specialties Details Why Contact Info Additional Information    Cassidy Naylor DO Family Medicine In 3 days  1 [facility] [address] Emergency Department Emergency Medicine  If symptoms worsen [address] [facility] [address]  [phone] [facility], [address], [address]          Discharge Medication List as of 1/8/2021  9:10 PM      CONTINUE these medications which have NOT CHANGED    Details   amLODIPine (NORVASC) 5 mg tablet TAKE 1 TABLET BY MOUTH ONCE DAILY, Normal      aspirin 81 mg chewable tablet Chew 81 mg every other day  , Historical Med      Cholecalciferol (VITAMIN D) 2000 units CAPS Take 1,000 Units by mouth daily , Historical Med      divalproex sodium (DEPAKOTE) 250 mg EC tablet Take 1 tablet (250 mg total) by mouth daily at bedtime, Starting Wed 10/28/2020, Normal      terazosin (HYTRIN) 5 mg capsule Take 5 mg by mouth daily at bedtime, Historical Med           No discharge procedures on file      PDMP Review     None          ED Provider  Electronically Signed by           Billie Garcia DO  01/08/21 3018

## 2021-01-09 NOTE — ED PROCEDURE NOTE
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

## 2021-01-10 LAB
ATRIAL RATE: 84 BPM
BACTERIA UR CULT: NORMAL
P AXIS: -24 DEGREES
PR INTERVAL: 272 MS
QRS AXIS: -56 DEGREES
QRSD INTERVAL: 134 MS
QT INTERVAL: 402 MS
QTC INTERVAL: 475 MS
T WAVE AXIS: 9 DEGREES
VENTRICULAR RATE: 84 BPM

## 2021-01-10 PROCEDURE — 93010 ELECTROCARDIOGRAM REPORT: CPT | Performed by: INTERNAL MEDICINE

## 2021-01-14 LAB
BACTERIA BLD CULT: NORMAL
BACTERIA BLD CULT: NORMAL

## 2021-02-01 DIAGNOSIS — F03.90 MILD DEMENTIA (HCC): ICD-10-CM

## 2021-02-02 RX ORDER — DIVALPROEX SODIUM 250 MG/1
250 TABLET, DELAYED RELEASE ORAL
Qty: 30 TABLET | Refills: 5 | Status: SHIPPED | OUTPATIENT
Start: 2021-02-02

## 2021-02-03 ENCOUNTER — TELEPHONE (OUTPATIENT)
Dept: GERIATRICS | Age: 86
End: 2021-02-03

## 2021-02-03 NOTE — TELEPHONE ENCOUNTER
Left voicemail message for Jhon Menon asking her to return phone call to the office  Dr Nataliia Oneal asked me to call to see how the patient is doing

## 2021-02-12 DIAGNOSIS — Z23 ENCOUNTER FOR IMMUNIZATION: ICD-10-CM

## 2021-02-18 ENCOUNTER — TELEMEDICINE (OUTPATIENT)
Dept: GERIATRICS | Age: 86
End: 2021-02-18
Payer: MEDICARE

## 2021-02-18 DIAGNOSIS — F03.90 MILD DEMENTIA (HCC): Primary | ICD-10-CM

## 2021-02-18 DIAGNOSIS — Z91.81 H/O FALL: ICD-10-CM

## 2021-02-18 DIAGNOSIS — E55.9 VITAMIN D DEFICIENCY: ICD-10-CM

## 2021-02-18 DIAGNOSIS — M54.2 NECK DISCOMFORT: ICD-10-CM

## 2021-02-18 DIAGNOSIS — F32.A DEPRESSION, UNSPECIFIED DEPRESSION TYPE: ICD-10-CM

## 2021-02-18 DIAGNOSIS — E87.1 HYPONATREMIA: ICD-10-CM

## 2021-02-18 DIAGNOSIS — E03.8 SUBCLINICAL HYPOTHYROIDISM: ICD-10-CM

## 2021-02-18 DIAGNOSIS — I10 ESSENTIAL HYPERTENSION: ICD-10-CM

## 2021-02-18 DIAGNOSIS — K59.09 OTHER CONSTIPATION: ICD-10-CM

## 2021-02-18 DIAGNOSIS — R26.81 GAIT INSTABILITY: ICD-10-CM

## 2021-02-18 PROCEDURE — 99215 OFFICE O/P EST HI 40 MIN: CPT | Performed by: STUDENT IN AN ORGANIZED HEALTH CARE EDUCATION/TRAINING PROGRAM

## 2021-02-18 RX ORDER — ESCITALOPRAM OXALATE 5 MG/1
TABLET ORAL
Qty: 30 TABLET | Refills: 0 | Status: SHIPPED | OUTPATIENT
Start: 2021-02-18 | End: 2021-03-14 | Stop reason: DRUGHIGH

## 2021-02-19 PROBLEM — E55.9 VITAMIN D DEFICIENCY: Status: ACTIVE | Noted: 2021-02-19

## 2021-02-19 PROBLEM — M54.2 NECK DISCOMFORT: Status: ACTIVE | Noted: 2021-02-19

## 2021-02-19 PROBLEM — E87.1 HYPONATREMIA: Status: ACTIVE | Noted: 2021-02-19

## 2021-02-19 NOTE — ASSESSMENT & PLAN NOTE
Patient stating he has not had a bowel movement for 1 week   Encourage increased fiber intake  Discussed taking prune juice 3-4 oz daily  Physical activity as able   Increase water intake

## 2021-02-19 NOTE — ASSESSMENT & PLAN NOTE
Continue amlodipine 5 mg daily    Continue aspirin 81 mg daily  Recommend adherence to a heart healthy diet

## 2021-02-19 NOTE — PROGRESS NOTES
Virtual Regular Visit      Assessment/Plan:    Problem List Items Addressed This Visit        Endocrine    Subclinical hypothyroidism      Previously trialed on treatment for subclinical hypothyroidism given history of mild dementia with no overt changes in cognition as result of which levothyroxine was discontinued               Cardiovascular and Mediastinum    Essential hypertension      Continue amlodipine 5 mg daily    Continue aspirin 81 mg daily  Recommend adherence to a heart healthy diet            Nervous and Auditory    Mild dementia ([facility] [address] ) - Primary      Patient continues to have episodes where he describes himself as becoming very irritated  He admits he is often upset by his wife's (who has severe dementia), repetitive behavior and in reorienting her  We have discussed several times about reorientation which may cause upset to his wife  As a result of which the patient was advised to distract his spouse rather than correct her repeatedly  Although the patient did state that his spouse does not understand why she does or asks certain things, it is evident that he still does not fully comprehend the severity of her dementia  The patient states that he continues to occasionally have visual hallucinations at nighttime  Symptoms do not appear to be consistent with Sherolyn Humbles however the patient does have episodes of having dimming of vision and would refer to ophthalmology   given patient's easily angered episodes and agitation will trial Lexapro starting at 2 5 mg daily and increasing to 5 mg daily  Recommend daughter-in-law to contact me after 1 month to ensure the  patient is tolerating the medication  Patient's most recent sodium was 133, will plan to repeat a BMP  Family has arranged for a home health aide to come in Monday to Friday from 13:00 to 21:00  This is excellent as this would likely decrease the frequency of arguments between the patient and his spouse      Recommend reorientation and redirection as needed  Manage chronic conditions  Maintain Falls precautions  Encourage patient to remain active mentally, physically and socially  Patient to participate in cognitively challenging exercises as able           Relevant Medications    escitalopram (LEXAPRO) 5 mg tablet       Other    Other constipation      Patient stating he has not had a bowel movement for 1 week   Encourage increased fiber intake  Discussed taking prune juice 3-4 oz daily  Physical activity as able   Increase water intake         Depression      Will trial a course of Lexapro  Will start a 2 5 mg daily for 2 weeks then increase to 5 mg daily  Daughter-in-law to contact me after 1 month to discuss update in mood  Will repeat CMP as prior sodium was 133         Relevant Medications    escitalopram (LEXAPRO) 5 mg tablet    Gait instability       Maintain Falls precautions  Use of an assistive devices needed  Previously referred to physical therapy         H/O fall      Recent fall in January   Mechanism unknown   Family had found patient lying on the floor at home  He was taken into the ED with CT head negative and etiology of confusion secondary to UTI for which she has been treated      Patient currently following with Urology  Previously referred to physical therapy   Will continue to follow         Neck discomfort      Recommend topical analgesic and heating pad as needed         Hyponatremia     Most recent sodium 133   Will order CMP  In 1 month as patient currently is on Depakote and will be starting Lexapro   Will continue to monitor         Relevant Orders    Comprehensive metabolic panel    Vitamin D deficiency      Will order vitamin-D level         Relevant Orders    Vitamin D 25 hydroxy               Reason for visit is   Chief Complaint   Patient presents with    Virtual Regular Visit    Virtual Regular Visit        Encounter provider Hector Milligan MD    Provider located at [facility] [address]  [address] [facility]  [address] [address] [facility] [address]      Recent Visits  Date Type Provider Dept   02/18/21 Telemedicine Reyes Pare, MD [facility] [facility]   Showing recent visits within past 7 days and meeting all other requirements     Future Appointments  No visits were found meeting these conditions  Showing future appointments within next 150 days and meeting all other requirements        The patient was identified by name and date of birth  Surya Bains was informed that this is a telemedicine visit and that the visit is being conducted through [employer] and patient was informed that this is a secure, HIPAA-compliant platform  He agrees to proceed     My office door was closed  No one else was in the room  He acknowledged consent and understanding of privacy and security of the video platform  The patient has agreed to participate and understands they can discontinue the visit at any time  Patient is aware this is a billable service  Subjective  Surya Bains is a 80 y o  male  who presents for routine neurocognitive follow-up   HPI     This is an 40-year-old male with mild dementia, HTN, constipation, urinary incontinence, depression, gait instability and prior falls who presents for routine neurocognitive follow-up  The patient is present with his spouse, son and daughter-in-law all of whom participate in his primary care  Today the patient explains that he continues to become very irritated and aggravated by his wife's behaviors  His wife does have a history of severe dementia for which the patient will often become angered by her repetitive speech/questions or in an effort to correct her thoughts/ actions  Family and myself have often reminded the patient to use more of a distracting technique rather than correction should his wife need reorientation as this has caused upset and agitation to both the patient and his spouse  It is obvious that the patient himself has not fully comprehended the severity of his wife's dementia  Today the patient explains that he continues to have visual hallucinations occasionally  This has been chronic  He denies typical hallucinations  consistent with Raine Erickson however does complain of having dimming of his vision as a result of which I recommended referral to Ophthalmology to rule out any organic cause  Certainly his hallucinations may be secondary to his dementia which may have been worsened by his recent urinary tract infection  The patient was seen at the ER in January after he sustained an unwitnessed fall  The patient was unable to relate the mechanism of his fall and was diagnosed with a urinary tract infection at that time when he had also had worsening confusion  CT of the head was unremarkable  Per family, the patient continues to be argumentative towards his wife however family has brought in increased services at home with a home health aide who will be coming in from 13:00 to 21:00  In doing so, this will likely facilitate lessening of the discord in arguments between the patient and his spouse and allow them the opportunity to remain active mentally, physically and socially well as family members are not at home  The patient also explains that he has not been sleeping well at nighttime as his wife will often awaken him and stays up herself  As a result of her staying awake, he also stays up with her  The patient continues to have constipation and stated he has not had a bowel movement in the past week  He denied any abdominal bloating or discomfort  We discussed starting prune juice 3-4 oz daily and increasing water intake which the patient stated he was lazy about taking buut aware of what he should do       The patient continues on Depakote for a history of mood disorder with dementia    He has been compliant with amlodipine and aspirin for a history of hypertension  He also continues on vitamin-D supplements for a history of vitamin-D deficiency      History reviewed  No pertinent past medical history  History reviewed  No pertinent surgical history  Current Outpatient Medications   Medication Sig Dispense Refill    amLODIPine (NORVASC) 5 mg tablet TAKE 1 TABLET BY MOUTH ONCE DAILY 90 tablet 3    aspirin 81 mg chewable tablet Chew 81 mg every other day        Cholecalciferol (VITAMIN D) 2000 units CAPS Take 1,000 Units by mouth daily       divalproex sodium (DEPAKOTE) 250 mg EC tablet Take 1 tablet (250 mg total) by mouth daily at bedtime 30 tablet 5    escitalopram (LEXAPRO) 5 mg tablet Take 2 5mg (half tablet) at nightime x 2 weeks then increase to 5mg (1 tablet)  at nightime 30 tablet 0    terazosin (HYTRIN) 5 mg capsule Take 5 mg by mouth daily at bedtime       No current facility-administered medications for this visit  No Known Allergies    Review of Systems   Unable to perform ROS: Dementia       Video Exam    There were no vitals filed for this visit  Physical Exam  Constitutional:       General: He is not in acute distress  Appearance: Normal appearance  He is not ill-appearing or diaphoretic  Comments: Elderly male sitting comfortably in chair in no obvious cardiorespiratory or painful distress   HENT:      Head: Normocephalic and atraumatic  Right Ear: External ear normal       Left Ear: External ear normal       Nose: Nose normal       Mouth/Throat:      Mouth: Mucous membranes are moist    Eyes:      General:         Right eye: No discharge  Left eye: No discharge  Conjunctiva/sclera: Conjunctivae normal    Neck:      Musculoskeletal: Normal range of motion  Comments: Some discomfort with ROM neck  Pulmonary:      Effort: Pulmonary effort is normal  No respiratory distress  Abdominal:      General: There is no distension  Palpations: Abdomen is soft  Tenderness:  There is no abdominal tenderness  Musculoskeletal: Normal range of motion  Right lower leg: No edema  Left lower leg: No edema  Skin:     General: Skin is dry  Neurological:      General: No focal deficit present  Mental Status: He is alert  Mental status is at baseline  Psychiatric:         Mood and Affect: Mood normal           I spent 45 minutes directly with the patient during this visit      VIRTUAL VISIT [address] acknowledges that he has consented to an online visit or consultation  He understands that the online visit is based solely on information provided by him, and that, in the absence of a face-to-face physical evaluation by the physician, the diagnosis he receives is both limited and provisional in terms of accuracy and completeness  This is not intended to replace a full medical face-to-face evaluation by the physician  Younger Parent understands and accepts these terms

## 2021-02-19 NOTE — ASSESSMENT & PLAN NOTE
Patient continues to have episodes where he describes himself as becoming very irritated  He admits he is often upset by his wife's (who has severe dementia), repetitive behavior and in reorienting her  We have discussed several times about reorientation which may cause upset to his wife  As a result of which the patient was advised to distract his spouse rather than correct her repeatedly  Although the patient did state that his spouse does not understand why she does or asks certain things, it is evident that he still does not fully comprehend the severity of her dementia  The patient states that he continues to occasionally have visual hallucinations at nighttime  Symptoms do not appear to be consistent with Bill Matsu however the patient does have episodes of having dimming of vision and would refer to ophthalmology   given patient's easily angered episodes and agitation will trial Lexapro starting at 2 5 mg daily and increasing to 5 mg daily  Recommend daughter-in-law to contact me after 1 month to ensure the  patient is tolerating the medication  Patient's most recent sodium was 133, will plan to repeat a BMP  Family has arranged for a home health aide to come in Monday to Friday from 13:00 to 21:00  This is excellent as this would likely decrease the frequency of arguments between the patient and his spouse      Recommend reorientation and redirection as needed  Manage chronic conditions  Maintain Falls precautions  Encourage patient to remain active mentally, physically and socially  Patient to participate in cognitively challenging exercises as able

## 2021-02-19 NOTE — ASSESSMENT & PLAN NOTE
Will trial a course of Lexapro  Will start a 2 5 mg daily for 2 weeks then increase to 5 mg daily      Daughter-in-law to contact me after 1 month to discuss update in mood  Will repeat CMP as prior sodium was 133

## 2021-02-19 NOTE — ASSESSMENT & PLAN NOTE
Most recent sodium 133   Will order CMP  In 1 month as patient currently is on Depakote and will be starting Lexapro   Will continue to monitor

## 2021-02-19 NOTE — ASSESSMENT & PLAN NOTE
Previously trialed on treatment for subclinical hypothyroidism given history of mild dementia with no overt changes in cognition as result of which levothyroxine was discontinued

## 2021-02-19 NOTE — ASSESSMENT & PLAN NOTE
Maintain Falls precautions  Use of an assistive devices needed  Previously referred to physical therapy

## 2021-02-19 NOTE — ASSESSMENT & PLAN NOTE
Recent fall in January   Mechanism unknown   Family had found patient lying on the floor at home  He was taken into the ED with CT head negative and etiology of confusion secondary to UTI for which she has been treated      Patient currently following with Urology  Previously referred to physical therapy   Will continue to follow

## 2021-03-04 ENCOUNTER — TELEPHONE (OUTPATIENT)
Dept: LAB | Facility: HOSPITAL | Age: 86
End: 2021-03-04

## 2021-03-08 ENCOUNTER — APPOINTMENT (OUTPATIENT)
Dept: LAB | Facility: HOSPITAL | Age: 86
End: 2021-03-08
Attending: STUDENT IN AN ORGANIZED HEALTH CARE EDUCATION/TRAINING PROGRAM
Payer: MEDICARE

## 2021-03-08 DIAGNOSIS — E87.1 HYPONATREMIA: ICD-10-CM

## 2021-03-08 DIAGNOSIS — E55.9 VITAMIN D DEFICIENCY: ICD-10-CM

## 2021-03-08 LAB
25(OH)D3 SERPL-MCNC: 35.6 NG/ML (ref 30–100)
ALBUMIN SERPL BCP-MCNC: 3.7 G/DL (ref 3.5–5)
ALP SERPL-CCNC: 60 U/L (ref 46–116)
ALT SERPL W P-5'-P-CCNC: 26 U/L (ref 12–78)
ANION GAP SERPL CALCULATED.3IONS-SCNC: 4 MMOL/L (ref 4–13)
AST SERPL W P-5'-P-CCNC: 19 U/L (ref 5–45)
BILIRUB SERPL-MCNC: 0.8 MG/DL (ref 0.2–1)
BUN SERPL-MCNC: 18 MG/DL (ref 5–25)
CALCIUM SERPL-MCNC: 10.2 MG/DL (ref 8.3–10.1)
CHLORIDE SERPL-SCNC: 106 MMOL/L (ref 100–108)
CO2 SERPL-SCNC: 31 MMOL/L (ref 21–32)
CREAT SERPL-MCNC: 1.11 MG/DL (ref 0.6–1.3)
GFR SERPL CREATININE-BSD FRML MDRD: 60 ML/MIN/1.73SQ M
GLUCOSE P FAST SERPL-MCNC: 96 MG/DL (ref 65–99)
POTASSIUM SERPL-SCNC: 4.1 MMOL/L (ref 3.5–5.3)
PROT SERPL-MCNC: 8 G/DL (ref 6.4–8.2)
SODIUM SERPL-SCNC: 141 MMOL/L (ref 136–145)

## 2021-03-08 PROCEDURE — 80053 COMPREHEN METABOLIC PANEL: CPT

## 2021-03-08 PROCEDURE — 82306 VITAMIN D 25 HYDROXY: CPT

## 2021-03-08 PROCEDURE — 36415 COLL VENOUS BLD VENIPUNCTURE: CPT

## 2021-03-12 DIAGNOSIS — F03.90 MILD DEMENTIA (HCC): ICD-10-CM

## 2021-03-12 DIAGNOSIS — F32.A DEPRESSION, UNSPECIFIED DEPRESSION TYPE: ICD-10-CM

## 2021-03-14 DIAGNOSIS — F03.90 MILD DEMENTIA (HCC): Primary | ICD-10-CM

## 2021-03-14 DIAGNOSIS — F32.A DEPRESSION, UNSPECIFIED DEPRESSION TYPE: ICD-10-CM

## 2021-03-14 DIAGNOSIS — F03.90 MILD DEMENTIA (HCC): ICD-10-CM

## 2021-03-14 RX ORDER — ESCITALOPRAM OXALATE 5 MG/1
5 TABLET ORAL DAILY
Qty: 30 TABLET | Refills: 2 | Status: SHIPPED | OUTPATIENT
Start: 2021-03-14 | End: 2021-04-29 | Stop reason: SDUPTHER

## 2021-03-14 RX ORDER — ESCITALOPRAM OXALATE 5 MG/1
5 TABLET ORAL
Qty: 30 TABLET | Refills: 2 | OUTPATIENT
Start: 2021-03-14

## 2021-03-15 RX ORDER — ESCITALOPRAM OXALATE 5 MG/1
TABLET ORAL
Qty: 30 TABLET | Refills: 0 | OUTPATIENT
Start: 2021-03-15

## 2021-04-06 ENCOUNTER — TELEPHONE (OUTPATIENT)
Dept: GERIATRICS | Age: 86
End: 2021-04-06

## 2021-04-07 NOTE — TELEPHONE ENCOUNTER
Spoke with patient's son Kesha Mead, with whom the patient lives about behavioral changes in the past few weeks  Per son, patient has been noted to have dry mouth, some hallucinations some aggressive behaviors since starting Depakote  Requested weaning off Depakote which I am certainly in agreement with  Weaning schedule given to decrease Depakote to 125 mg at night for week 1 and then 125 mg on Monday/ Wednesday / Friday for week 2 then discontinue  Patient's last Sampson was in May of 2020 and relayed patient will need to be revaluated for repeat Sampson to determine whether changes are due to progression of Alzheimers or otherwise  Also recommended starting vitamin E 800 International Units b i d  given  Recent recommendations from evidence based study showing some improvement in overall cognition with this vitamin supplementation

## 2021-04-29 DIAGNOSIS — F03.90 MILD DEMENTIA (HCC): ICD-10-CM

## 2021-04-29 RX ORDER — ESCITALOPRAM OXALATE 5 MG/1
5 TABLET ORAL DAILY
Qty: 30 TABLET | Refills: 11 | Status: SHIPPED | OUTPATIENT
Start: 2021-04-29

## 2021-05-28 ENCOUNTER — TELEPHONE (OUTPATIENT)
Dept: GERIATRICS | Age: 86
End: 2021-05-28

## 2021-05-28 NOTE — TELEPHONE ENCOUNTER
Family will be faxing form [id number] to office for completion in anticipating of placing patient and spouse in nursing home care  Caller will include direction if family will  completed form or this office will fax to facility of family's choice  Informed caller, Jody Bustos, that there will be a fee for paperwork completion  Caller understands

## 2022-03-24 ENCOUNTER — TELEPHONE (OUTPATIENT)
Dept: GERIATRICS | Age: 87
End: 2022-03-24

## 2022-03-24 NOTE — TELEPHONE ENCOUNTER
Left voicemail message for daughter-in-law, Maribell Coon, to return call to inform us of who is PCP for Niurka Echavarria and spouse  Both patient and spouse have since moved to [facility] and Dr Honorio Baptiste no longer is PCP  Need new PCP's name to update charts for both patient and spouse (remove Dr Honorio Baptiste as PCP)

## 2022-05-05 ENCOUNTER — TELEPHONE (OUTPATIENT)
Dept: GERIATRICS | Age: 87
End: 2022-05-05

## 2022-05-05 NOTE — TELEPHONE ENCOUNTER
Contacted patient's son, Dr Trev Salguero, who confirmed both patient and spouse are residents at [facility]  PCP is Dr Virgen Fuelling  Moved to [facility] possibly in May/June of 2021  Updated address and phone number in [address]  Will email Value-Based team to update PCP in [employer]

## 2022-06-23 NOTE — TELEPHONE ENCOUNTER
06/23/22 11:39 AM        Thank you for your request  Your request has been received, reviewed, and the patient chart updated  The PCP has successfully been removed with a patient attribution note  This message will now be completed          Thank you  Iman Coelho

## 2022-12-15 ENCOUNTER — CONSULT (OUTPATIENT)
Dept: GASTROENTEROLOGY | Facility: CLINIC | Age: 87
End: 2022-12-15

## 2022-12-15 ENCOUNTER — TELEPHONE (OUTPATIENT)
Dept: GASTROENTEROLOGY | Facility: CLINIC | Age: 87
End: 2022-12-15

## 2022-12-15 VITALS
DIASTOLIC BLOOD PRESSURE: 75 MMHG | WEIGHT: 187.4 LBS | HEART RATE: 68 BPM | HEIGHT: 72 IN | SYSTOLIC BLOOD PRESSURE: 134 MMHG | BODY MASS INDEX: 25.38 KG/M2

## 2022-12-15 DIAGNOSIS — R11.14 BILIOUS VOMITING WITH NAUSEA: ICD-10-CM

## 2022-12-15 DIAGNOSIS — K21.9 GASTROESOPHAGEAL REFLUX DISEASE WITHOUT ESOPHAGITIS: ICD-10-CM

## 2022-12-15 DIAGNOSIS — R13.19 ESOPHAGEAL DYSPHAGIA: ICD-10-CM

## 2022-12-15 RX ORDER — CEFTRIAXONE 1 G/1
INJECTION, POWDER, FOR SOLUTION INTRAMUSCULAR; INTRAVENOUS
COMMUNITY
Start: 2022-07-19

## 2022-12-15 RX ORDER — ESCITALOPRAM OXALATE 10 MG/1
TABLET ORAL
COMMUNITY
Start: 2022-09-20

## 2022-12-15 RX ORDER — CEFDINIR 300 MG/1
CAPSULE ORAL
COMMUNITY
Start: 2022-07-22

## 2022-12-15 RX ORDER — OMEPRAZOLE 20 MG/1
CAPSULE, DELAYED RELEASE ORAL
COMMUNITY
Start: 2022-09-20

## 2022-12-15 RX ORDER — ERGOCALCIFEROL 1.25 MG/1
CAPSULE ORAL
COMMUNITY
Start: 2022-11-10

## 2022-12-15 RX ORDER — ONDANSETRON 4 MG/1
TABLET, FILM COATED ORAL
COMMUNITY
Start: 2022-07-19

## 2022-12-15 RX ORDER — NYSTATIN 100000 [USP'U]/G
POWDER TOPICAL
COMMUNITY
Start: 2022-09-19

## 2022-12-15 RX ORDER — SENNOSIDES 8.6 MG
TABLET ORAL
COMMUNITY
Start: 2022-10-20

## 2022-12-15 NOTE — PROGRESS NOTES
[facility] - Outpatient Consultation  Dora Jones 80 y o  male MRN: [id number]  Encounter: [id number]          ASSESSMENT AND PLAN:      1  Esophageal dysphagia  60-year-old male from  [address] referred to us for excess amount of saliva, dysphagia and few episode of vomiting  As per son who is a physician at [place] [address], patient had a history of esophageal stricture and had dilatation few time at [address] [place] [address] with Dr Ting Aguilar, last EGD was long time ago  Patient is taking omeprazole 20 mg p o  daily, no history of aspiration pneumonia, patient has a mild dementia is not a reliable history but as per nursing home, recently patient is doing well because he is eating slowly and not lying down  Differential diagnosis for dysphagia could be esophageal stricture versus esophageal dysmotility, oropharyngeal dysphagia need to ruled out, will schedule for EGD and possible dilation  - EGD; Future    2  Gastroesophageal reflux disease without esophagitis  Continue with omeprazole 20 mg p o  daily, last endoscopy was more than 5 years ago, no record available in epic, will schedule for EGD  - EGD; Future    3  Bilious vomiting with nausea  Continue Zofran as needed  - EGD; Future    ______________________________________________________________________    HPI: 60-year-old male with history of esophageal stricture, chronic GERD, hypertension, mild dementia, depression referred from nursing home for dysphagia, excessive salivation, 1 or 2 episode of nausea and vomiting  Patient is not a reliable historian but son who is a physician at [address] provide detailed history that patient had this problem for a long duration, he used to see Dr Ting Aguilar at [address] [place] [address] and had a EGD with dilatation in the past, last time they discussed about scheduling for Botox injection at [facility]    Patient weight remains stable, he had a recent episode of vomiting after he ate something, most of time mechanical soft diet go down easily, nursing home is cutting his meat into small pieces which is helping him      REVIEW OF SYSTEMS:    CONSTITUTIONAL: Denies any fever, chills, rigors, and weight loss  HEENT: No earache or tinnitus  Denies hearing loss or visual disturbances  CARDIOVASCULAR: No chest pain or palpitations  RESPIRATORY: Denies any cough, hemoptysis, shortness of breath or dyspnea on exertion  GASTROINTESTINAL: As noted in the History of Present Illness  GENITOURINARY: No problems with urination  Denies any hematuria or dysuria  NEUROLOGIC: No dizziness or vertigo, denies headaches  MUSCULOSKELETAL: Denies any muscle or joint pain  SKIN: Denies skin rashes or itching  ENDOCRINE: Denies excessive thirst  Denies intolerance to heat or cold  PSYCHOSOCIAL: Denies depression or anxiety  Denies any recent memory loss  Historical Information   History reviewed  No pertinent past medical history  History reviewed  No pertinent surgical history  Social History   Social History     Substance and Sexual Activity   Alcohol Use Not Currently     Social History     Substance and Sexual Activity   Drug Use Never     Social History     Tobacco Use   Smoking Status Former   • Types: Cigarettes   • Quit date:    • Years since quittin 9   Smokeless Tobacco Never     History reviewed  No pertinent family history      Meds/Allergies       Current Outpatient Medications:   •  aspirin 81 mg chewable tablet  •  escitalopram (LEXAPRO) 10 mg tablet  •  omeprazole (PriLOSEC) 20 mg delayed release capsule  •  senna (SENOKOT) 8 6 mg  •  terazosin (HYTRIN) 5 mg capsule  •  amLODIPine (NORVASC) 5 mg tablet  •  cefdinir (OMNICEF) 300 mg capsule  •  cefTRIAXone (ROCEPHIN) 1 g injection  •  Cholecalciferol (VITAMIN D) 2000 units CAPS  •  divalproex sodium (DEPAKOTE) 250 mg EC tablet  •  ergocalciferol (VITAMIN D2) 50,000 units  •  escitalopram (LEXAPRO) 5 mg tablet  •  nystatin (MYCOSTATIN) powder  •  ondansetron (ZOFRAN) 4 mg tablet    No Known Allergies        Objective     Blood pressure 134/75, pulse 68, height 6' (1 829 m), weight 85 kg (187 lb 6 4 oz)  Body mass index is 25 42 kg/m²  PHYSICAL EXAM:      General Appearance:   Alert, cooperative, no distress   HEENT:   Normocephalic, atraumatic, anicteric      Neck:  Supple, symmetrical, trachea midline   Lungs:   Clear to auscultation bilaterally; no rales, rhonchi or wheezing; respirations unlabored    Heart[de-identified]   Regular rate and rhythm; no murmur, rub, or gallop  Abdomen:   Soft, non-tender, non-distended; normal bowel sounds; no masses, no organomegaly    Genitalia:   Deferred    Rectal:   Deferred    Extremities:  No cyanosis, clubbing or edema    Pulses:  2+ and symmetric    Skin:  No jaundice, rashes, or lesions    Lymph nodes:  No palpable cervical lymphadenopathy        Lab Results:   No visits with results within 1 Day(s) from this visit  Latest known visit with results is:   Appointment on 03/08/2021   Component Date Value   • Sodium 03/08/2021 141    • Potassium 03/08/2021 4 1    • Chloride 03/08/2021 106    • CO2 03/08/2021 31    • ANION GAP 03/08/2021 4    • BUN 03/08/2021 18    • Creatinine 03/08/2021 1 11    • Glucose, Fasting 03/08/2021 96    • Calcium 03/08/2021 10 2 (H)    • AST 03/08/2021 19    • ALT 03/08/2021 26    • Alkaline Phosphatase 03/08/2021 60    • Total Protein 03/08/2021 8 0    • Albumin 03/08/2021 3 7    • Total Bilirubin 03/08/2021 0 80    • eGFR 03/08/2021 60    • Vit D, 25-Hydroxy 03/08/2021 35 6          Radiology Results:   No results found

## 2022-12-15 NOTE — TELEPHONE ENCOUNTER
I faxed EGD order and Dr Linda Khan office note to [facility] so they are able to call and schedule EGD for patient at [address] [place] [address] is aware

## 2023-01-01 ENCOUNTER — HOME CARE VISIT (OUTPATIENT)
Dept: HOME HEALTH SERVICES | Facility: HOME HEALTHCARE | Age: 88
End: 2023-01-01
Payer: MEDICARE

## 2023-01-01 ENCOUNTER — HOME CARE VISIT (OUTPATIENT)
Dept: HOME HOSPICE | Facility: HOSPICE | Age: 88
End: 2023-01-01
Payer: MEDICARE

## 2023-01-01 ENCOUNTER — HOSPICE ADMISSION (OUTPATIENT)
Dept: HOME HOSPICE | Facility: HOSPICE | Age: 88
End: 2023-01-01
Payer: MEDICARE

## 2023-01-01 VITALS
RESPIRATION RATE: 24 BRPM | BODY MASS INDEX: 24.41 KG/M2 | HEIGHT: 72 IN | TEMPERATURE: 98 F | HEART RATE: 88 BPM | SYSTOLIC BLOOD PRESSURE: 132 MMHG | WEIGHT: 180.2 LBS | DIASTOLIC BLOOD PRESSURE: 84 MMHG

## 2023-01-01 VITALS
TEMPERATURE: 99.2 F | HEART RATE: 116 BPM | SYSTOLIC BLOOD PRESSURE: 109 MMHG | RESPIRATION RATE: 22 BRPM | DIASTOLIC BLOOD PRESSURE: 65 MMHG

## 2023-01-01 DIAGNOSIS — Z51.5 HOSPICE CARE PATIENT: Primary | ICD-10-CM

## 2023-01-01 PROCEDURE — G0299 HHS/HOSPICE OF RN EA 15 MIN: HCPCS

## 2023-01-01 PROCEDURE — 10330087 HSPC SERVICE INTENSITY ADD-ON

## 2023-01-01 PROCEDURE — G0156 HHCP-SVS OF AIDE,EA 15 MIN: HCPCS

## 2023-01-01 PROCEDURE — G0155 HHCP-SVS OF CSW,EA 15 MIN: HCPCS

## 2023-01-01 RX ORDER — MORPHINE SULFATE 20 MG/ML
SOLUTION ORAL
Qty: 30 ML | Refills: 0 | Status: SHIPPED | OUTPATIENT
Start: 2023-01-01 | End: 2023-09-16 | Stop reason: CLARIF

## 2023-01-01 RX ORDER — LORAZEPAM 2 MG/ML
CONCENTRATE ORAL
Qty: 30 ML | Refills: 0 | Status: SHIPPED | OUTPATIENT
Start: 2023-01-01 | End: 2023-09-16 | Stop reason: CLARIF

## 2023-01-04 ENCOUNTER — PREP FOR PROCEDURE (OUTPATIENT)
Dept: GASTROENTEROLOGY | Facility: CLINIC | Age: 88
End: 2023-01-04

## 2023-01-04 ENCOUNTER — TELEPHONE (OUTPATIENT)
Dept: GASTROENTEROLOGY | Facility: CLINIC | Age: 88
End: 2023-01-04

## 2023-01-04 DIAGNOSIS — K21.9 GASTROESOPHAGEAL REFLUX DISEASE WITHOUT ESOPHAGITIS: ICD-10-CM

## 2023-01-04 DIAGNOSIS — R13.19 ESOPHAGEAL DYSPHAGIA: Primary | ICD-10-CM

## 2023-01-04 DIAGNOSIS — R11.14 BILIOUS VOMITING WITH NAUSEA: ICD-10-CM

## 2023-01-04 NOTE — TELEPHONE ENCOUNTER
Scheduled date of EGD(as of today):2/3/23  Physician performing EGD:DR BURR  Location of EGD:[place]  Clearances: NA

## 2023-02-09 ENCOUNTER — NURSING HOME VISIT (OUTPATIENT)
Dept: GERIATRICS | Facility: OTHER | Age: 88
End: 2023-02-09
Payer: MEDICARE

## 2023-02-09 DIAGNOSIS — F33.1 MODERATE EPISODE OF RECURRENT MAJOR DEPRESSIVE DISORDER (HCC): Primary | ICD-10-CM

## 2023-02-09 DIAGNOSIS — F03.A18 MILD DEMENTIA WITH OTHER BEHAVIORAL DISTURBANCE, UNSPECIFIED DEMENTIA TYPE (HCC): ICD-10-CM

## 2023-02-09 PROCEDURE — 99308 SBSQ NF CARE LOW MDM 20: CPT | Performed by: NURSE PRACTITIONER

## 2023-02-27 ENCOUNTER — TELEPHONE (OUTPATIENT)
Dept: GASTROENTEROLOGY | Facility: CLINIC | Age: 88
End: 2023-02-27

## 2023-02-27 NOTE — TELEPHONE ENCOUNTER
Spoke to Alycia salgado Certain confirming pt's egd scheduled on 3/9/23 at [facility] with Dr José Miguel Feliz  Informed [facility] would be calling the day prior with the arrival time  Informed pt would need to be npo after midnight and would need a  the day of the procedure due to being under sedation  I faxed instructions to nursing Artie gagnon attn: nurse [phone]

## 2023-02-27 NOTE — PROGRESS NOTES
MEDICATION MANAGEMENT NOTE        [facility] [facility]  POS: 28: NF- Long Term, [address] [facility]      Name and Date of Birth:  Nicole Castillo 80 y.o. 1934 MRN: [id number]    Date of Visit: February 9, 2023    No Known Allergies  SUBJECTIVE:    Aleena Quezada is seen today for a follow up for Major Depressive Disorder and anxiety. He continues to experience ongoing symptoms since the last visit. Tolerating transition to Paxil and now on 20 mg dose. Per  note on 2/2/2023: " Aleena Quezada admitted to having little interest in things, feeling down/depressed, feeling tired, having trouble with his appetite and feeling badly about himself. He exhibited verbal behavior disturbances at times during this review. Staff provide support and reassurance as needed. [facility] offered counseling services but Nicole Flores declined them at this time. "      He denies any side effects from current psychiatric medications. PLAN:    All medications and routine orders were reviewed and updated as needed. Continue current medications:  Medication management every 1-2 months      Diagnoses and all orders for this visit:    Moderate episode of recurrent major depressive disorder ([address])    Mild dementia with other behavioral disturbance, unspecified dementia type        Current Medications  Medications reviewed and updated in facility chart. Psychotherapy Provided:     Individual psychotherapy provided: Supportive counseling provided. Reassurance and supportive therapy provided. HPI ROS Appetite Changes and Sleep:     He reports fluctuating sleep pattern, fluctuating appetite, fluctuating energy levels.  Denies homicidal ideation, denies suicidal ideation    Review Of Systems:   no complaints, all other systems are negative         Mental Status Evaluation:    Appearance:  age appropriate   Behavior:  cooperative, guarded   Speech:  scant   Mood:  depressed   Affect:  flat   Thought Process:  disorganized Associations: loose associations   Thought Content:  some paranoia   Perceptual Disturbances: none   Risk Potential: Suicidal ideation - None  Homicidal ideation - None  Potential for aggression - No   Sensorium:  oriented to person   Memory:  recent memory impaired   Consciousness:  alert and awake   Attention: decreased concentration and decreased attention span   Insight:  limited   Judgment: limited   Gait/Station: in bed   Motor Activity: no abnormal movements     Past Psychiatric History Update:     Inpatient Psychiatric Admission Since Last Encounter:   no  Suicide Attempt Or Self Mutilation Since Last Encounter:   no  Incidence of Violent Behavior Since Last Encounter:   no    Traumatic History Update:     New Onset of Abuse Since Last Encounter:   no  Traumatic Events Since Last Encounter:   no    Social History:    Changes since last encounter:  no    History Review: The following portions of the patient's history were reviewed and updated as appropriate: allergies, current medications, past family history, past medical history, past social history, past surgical history and problem list     OBJECTIVE:     Vital signs in last 24 hours: Vital signs and nursing notes reviewed in facility chart. Laboratory Results: Lab results reviewed in facility chart. Medications Risks/Benefits:      Risks, Benefits And Possible Side Effects Of Medications:    Discussed risks and benefits of treatment with patient including risk of suicidality, serotonin syndrome, increased QTc interval and SIADH related to treatment with antidepressants; Risk of induction of manic symptoms in certain patient populations     Controlled Medication Discussion:     Not applicable - controlled prescriptions are not prescribed by this practice    Evaluation of Psychotropic Drugs for possible gradual dose reductions    Psychotropic medications have been reviewed. Patient continues with symptoms of worsening depression as noted above. Any/or further dose reductions at this time would be clinically contraindicated, as it would be likely to cause worsening of symptoms.         Valerie Brunner, CRNP

## 2023-03-02 ENCOUNTER — NURSING HOME VISIT (OUTPATIENT)
Dept: GERIATRICS | Facility: OTHER | Age: 88
End: 2023-03-02

## 2023-03-02 DIAGNOSIS — F03.A18 MILD DEMENTIA WITH OTHER BEHAVIORAL DISTURBANCE, UNSPECIFIED DEMENTIA TYPE: ICD-10-CM

## 2023-03-02 DIAGNOSIS — F33.1 MODERATE EPISODE OF RECURRENT MAJOR DEPRESSIVE DISORDER (HCC): Primary | ICD-10-CM

## 2023-03-03 NOTE — PROGRESS NOTES
MEDICATION MANAGEMENT NOTE        [facility]  POS: 28: NF- Long Term, [facility] [place] [address]      Name and Date of Birth:  Deidre Cunningham 80 y o  1934 MRN: [id number]    Date of Visit: March 2, 2023    No Known Allergies  SUBJECTIVE:    Brit Coker is seen today for a follow up for Major Depressive Disorder, psychosis and dementia  He continues to experience on and off symptoms since the last visit  Brit Coker is found resting in bed  Presents flat and depressed  He is alert and oriented x 2, not oriented to date  Staff notes indicate episodes of "confusion and delusions"  No aggression or inappropriate behaviors noted  He does present with some depressed mood, but states he feels "okay" here and well cared for  Continues on Paxil 20 mg with some improvement  He denies any side effects from current psychiatric medications  PLAN:    All medications and routine orders were reviewed and updated as needed  Continue current medications:  Medication management every 2 months  Plan discussed with: Patient    Diagnoses and all orders for this visit:    Moderate episode of recurrent major depressive disorder ([facility] [address] )    Mild dementia with other behavioral disturbance, unspecified dementia type        Current Medications  Medications reviewed and updated in facility chart  Psychotherapy Provided:     Individual psychotherapy provided: Supportive counseling provided  Reassurance and supportive therapy provided  HPI ROS Appetite Changes and Sleep:     He reports fluctuating sleep pattern, fluctuating appetite, fluctuating energy levels   Denies homicidal ideation, denies suicidal ideation    Review Of Systems:   all other systems are negative         Mental Status Evaluation:    Appearance:  age appropriate   Behavior:  calm, guarded   Speech:  scant   Mood:  depressed   Affect:  flat   Thought Process:  decreased rate of thoughts   Associations: concrete associations Thought Content:  no overt delusions   Perceptual Disturbances: none   Risk Potential: Suicidal ideation - None  Homicidal ideation - None  Potential for aggression - No   Sensorium:  oriented to person and place   Memory:  recent memory impaired   Consciousness:  alert and awake   Attention: decreased concentration and decreased attention span   Insight:  impaired   Judgment: impaired   Gait/Station: in bed   Motor Activity: no abnormal movements     Past Psychiatric History Update:     Inpatient Psychiatric Admission Since Last Encounter:   no  Suicide Attempt Or Self Mutilation Since Last Encounter:   no  Incidence of Violent Behavior Since Last Encounter:   no    Traumatic History Update:     New Onset of Abuse Since Last Encounter:   no  Traumatic Events Since Last Encounter:   no    Social History:    Changes since last encounter:  no    History Review: The following portions of the patient's history were reviewed and updated as appropriate: allergies, current medications, past family history, past medical history, past social history, past surgical history and problem list     OBJECTIVE:     Vital signs in last 24 hours: Vital signs and nursing notes reviewed in facility chart  Laboratory Results: Lab results reviewed in facility chart  Medications Risks/Benefits:      Risks, Benefits And Possible Side Effects Of Medications:    Discussed risks and benefits of treatment with patient including risk of suicidality, serotonin syndrome, increased QTc interval and SIADH related to treatment with antidepressants; Risk of induction of manic symptoms in certain patient populations, :N/A and unable to comprehend     Controlled Medication Discussion:     Not applicable - controlled prescriptions are not prescribed by this practice    Evaluation of Psychotropic Drugs for possible gradual dose reductions    Psychotropic medications have been reviewed    Patient continues with symptoms of mood disorder with psychosis as noted above  Any/or further dose reductions at this time would be clinically contraindicated, as it would be likely to cause worsening of symptoms          MATTHIEU Mares

## 2023-03-07 ENCOUNTER — TELEPHONE (OUTPATIENT)
Dept: OTHER | Facility: OTHER | Age: 88
End: 2023-03-07

## 2023-03-07 NOTE — TELEPHONE ENCOUNTER
Patient is scheduled for EGD on 3/9/23, daughter in law stated she canceled that a month ago and still getting the notifications  Daughter wants to cancel and does not want to reschedule

## 2023-03-08 NOTE — TELEPHONE ENCOUNTER
Procedure cancelled, no note documenting that it was canceled a month ago  Daughter wishes not to reschedule

## 2023-05-04 ENCOUNTER — NURSING HOME VISIT (OUTPATIENT)
Dept: GERIATRICS | Facility: OTHER | Age: 88
End: 2023-05-04
Payer: MEDICARE

## 2023-05-04 DIAGNOSIS — F03.A18 MILD DEMENTIA WITH OTHER BEHAVIORAL DISTURBANCE, UNSPECIFIED DEMENTIA TYPE (HCC): ICD-10-CM

## 2023-05-04 DIAGNOSIS — F33.1 MODERATE EPISODE OF RECURRENT MAJOR DEPRESSIVE DISORDER (HCC): Primary | ICD-10-CM

## 2023-05-04 PROCEDURE — 99308 SBSQ NF CARE LOW MDM 20: CPT | Performed by: NURSE PRACTITIONER

## 2023-06-29 ENCOUNTER — NURSING HOME VISIT (OUTPATIENT)
Dept: GERIATRICS | Facility: OTHER | Age: 88
End: 2023-06-29
Payer: MEDICARE

## 2023-06-29 DIAGNOSIS — F03.A18 MILD DEMENTIA WITH OTHER BEHAVIORAL DISTURBANCE, UNSPECIFIED DEMENTIA TYPE (HCC): ICD-10-CM

## 2023-06-29 DIAGNOSIS — F33.1 MODERATE EPISODE OF RECURRENT MAJOR DEPRESSIVE DISORDER (HCC): Primary | ICD-10-CM

## 2023-06-29 PROCEDURE — 99308 SBSQ NF CARE LOW MDM 20: CPT | Performed by: NURSE PRACTITIONER

## 2023-07-28 NOTE — PROGRESS NOTES
MEDICATION MANAGEMENT NOTE        [facility] [address] [facility]  POS: 28: NF- Long Term, [employer]      Name and Date of Birth:  Nicole Castillo 80 y.o. 1934 MRN: [id number]    Date of Visit: May 4, 2023    No Known Allergies  SUBJECTIVE:    Aleena Quezada is seen today for a follow up for Major Depressive Disorder and dementia. He continues to experience on and off depressive symptoms since the last visit. Patient with worsening dementia and will become more confused and looking for family, anxious and difficult to redirect. He admits to some sadness about being here. He continues on Paxil 20 mg. If mood or anxiety symptoms continue, can consider increase to 30 mg. He denies any side effects from psychiatric medications. PLAN:    All medications and routine orders were reviewed and updated as needed. Continue current medications:  Medication management every 2 months      Diagnoses and all orders for this visit:    Moderate episode of recurrent major depressive disorder ([address])    Mild dementia with other behavioral disturbance, unspecified dementia type ([address])        Current Medications  Medications reviewed and updated in facility chart. HPI ROS Appetite Changes and Sleep:     He reports fluctuating sleep pattern, fluctuating appetite, fluctuating energy levels.  Denies homicidal ideation, denies suicidal ideation    Review Of Systems:   no complaints, all other systems are negative         Mental Status Evaluation:    Appearance:  marginal hygiene   Behavior:  cooperative   Speech:  scant   Mood:  dysphoric   Affect:  blunted   Thought Process:  disorganized   Associations: loose associations   Thought Content:  no overt delusions   Perceptual Disturbances: none   Risk Potential: Suicidal ideation - None  Homicidal ideation - None  Potential for aggression - No   Sensorium:  oriented to person   Memory:  recent memory impaired   Consciousness:  alert and awake Attention: decreased concentration and decreased attention span   Insight:  poor   Judgment: limited   Gait/Station: in bed   Motor Activity: no abnormal movements       History Review: The following portions of the patient's history were reviewed and updated as appropriate: psychiatric history, trauma history allergies, current medications, past family history, past medical history, past social history, past surgical history and problem list     OBJECTIVE:     Vital signs in last 24 hours: Vital signs and nursing notes reviewed in facility chart. Laboratory Results: Lab results reviewed in facility chart. Medications Risks/Benefits:      Risks, Benefits And Possible Side Effects Of Medications:    Discussed risks and benefits of treatment with patient including :N/A and unable to comprehend     Controlled Medication Discussion:     Naye Conway has not been filling controlled prescriptions on time as prescribed according to [address] [facility] [address] [facility]    Evaluation of Psychotropic Drugs for possible gradual dose reductions    Psychotropic medications have been reviewed. Patient continues with symptoms of depression and anxiety as noted above. Any/or further dose reductions at this time would be clinically contraindicated, as it would be likely to cause worsening of symptoms.         MATTHIEU Fuentes

## 2023-07-28 NOTE — PROGRESS NOTES
MEDICATION MANAGEMENT NOTE        [facility] [facility]  POS: 28: NF- Long Term, [address] [place]      Name and Date of Birth:  Tenzin Velazquez 80 y.o. 1934 MRN: [id number]    Date of Visit: June 29, 2023    No Known Allergies  SUBJECTIVE:    Lyssa Kowaslki is seen today for a follow up for Major Depressive Disorder and dementia. He continues to experience on and off depressive symptoms since the last visit. Now on Paxil 30 mg for some increase in anxiety. Isolated episode of possible hallucinations. Notes from previous provider ([facility]) indicate  Visual hallucinations has been a chronic problem since 2021. As well as irritability/ aggression. He denies any side effects from current psychiatric medications. PLAN:    All medications and routine orders were reviewed and updated as needed. Continue current medications:  Medication management every 3 months      Diagnoses and all orders for this visit:    Moderate episode of recurrent major depressive disorder ([address])    Mild dementia with other behavioral disturbance, unspecified dementia type ([address])        Current Medications  Medications reviewed and updated in facility chart. HPI ROS Appetite Changes and Sleep:     He reports fluctuating sleep pattern, fluctuating appetite, fluctuating energy levels.  Denies homicidal ideation, denies suicidal ideation    Review Of Systems:   no complaints, all other systems are negative         Mental Status Evaluation:    Appearance:  marginal hygiene   Behavior:  guarded   Speech:  scant   Mood:  dysphoric   Affect:  blunted   Thought Process:  disorganized   Associations: loose associations   Thought Content:  some paranoia   Perceptual Disturbances: visual hallucinations   Risk Potential: Suicidal ideation - None at present  Homicidal ideation - None at present  Potential for aggression - Not at present   Sensorium:  oriented to person   Memory:  recent memory impaired Consciousness:  alert and awake   Attention: decreased concentration and decreased attention span   Insight:  limited   Judgment: limited   Gait/Station: normal gait/station, normal balance   Motor Activity: no abnormal movements       History Review: The following portions of the patient's history were reviewed and updated as appropriate: psychiatric history, trauma history allergies, current medications, past family history, past medical history, past social history, past surgical history and problem list     OBJECTIVE:     Vital signs in last 24 hours: Vital signs and nursing notes reviewed in facility chart. Laboratory Results: Lab results reviewed in facility chart. Medications Risks/Benefits:      Risks, Benefits And Possible Side Effects Of Medications:    Discussed risks and benefits of treatment with patient including :N/A and unable to comprehend     Controlled Medication Discussion:     Not applicable - controlled prescriptions are not prescribed by this practice    Evaluation of Psychotropic Drugs for possible gradual dose reductions    Psychotropic medications have been reviewed. Patient continues with symptoms of depression and mood lability as noted above. Any/or further dose reductions at this time would be clinically contraindicated, as it would be likely to cause worsening of symptoms.         MATTHIEU Goodwin

## 2023-07-31 ENCOUNTER — APPOINTMENT (EMERGENCY)
Dept: RADIOLOGY | Facility: HOSPITAL | Age: 88
End: 2023-07-31
Payer: MEDICARE

## 2023-07-31 ENCOUNTER — APPOINTMENT (EMERGENCY)
Dept: CT IMAGING | Facility: HOSPITAL | Age: 88
End: 2023-07-31
Payer: MEDICARE

## 2023-07-31 ENCOUNTER — HOSPITAL ENCOUNTER (EMERGENCY)
Facility: HOSPITAL | Age: 88
Discharge: HOME | End: 2023-07-31
Attending: EMERGENCY MEDICINE
Payer: MEDICARE

## 2023-07-31 VITALS
HEART RATE: 79 BPM | SYSTOLIC BLOOD PRESSURE: 128 MMHG | HEIGHT: 72 IN | TEMPERATURE: 98.8 F | RESPIRATION RATE: 18 BRPM | BODY MASS INDEX: 25.32 KG/M2 | OXYGEN SATURATION: 93 % | WEIGHT: 186.95 LBS | DIASTOLIC BLOOD PRESSURE: 73 MMHG

## 2023-07-31 DIAGNOSIS — I77.810 AORTIC ECTASIA, THORACIC (HCC): ICD-10-CM

## 2023-07-31 DIAGNOSIS — N39.0 ACUTE URINARY TRACT INFECTION: Primary | ICD-10-CM

## 2023-07-31 LAB
ALBUMIN SERPL BCP-MCNC: 3.5 G/DL (ref 3.5–5)
ALP SERPL-CCNC: 79 U/L (ref 34–104)
ALT SERPL W P-5'-P-CCNC: 12 U/L (ref 7–52)
AMORPH URATE CRY URNS QL MICRO: ABNORMAL
ANION GAP SERPL CALCULATED.3IONS-SCNC: 7 MMOL/L
AST SERPL W P-5'-P-CCNC: 16 U/L (ref 13–39)
BACTERIA UR QL AUTO: ABNORMAL /HPF
BASOPHILS # BLD AUTO: 0.09 THOUSANDS/ÂΜL (ref 0–0.1)
BASOPHILS NFR BLD AUTO: 1 % (ref 0–1)
BILIRUB SERPL-MCNC: 1.07 MG/DL (ref 0.2–1)
BILIRUB UR QL STRIP: NEGATIVE
BUN SERPL-MCNC: 13 MG/DL (ref 5–25)
CALCIUM SERPL-MCNC: 9.3 MG/DL (ref 8.4–10.2)
CHLORIDE SERPL-SCNC: 104 MMOL/L (ref 96–108)
CLARITY UR: CLEAR
CO2 SERPL-SCNC: 25 MMOL/L (ref 21–32)
COLOR UR: ABNORMAL
CREAT SERPL-MCNC: 0.83 MG/DL (ref 0.6–1.3)
EOSINOPHIL # BLD AUTO: 0.44 THOUSAND/ÂΜL (ref 0–0.61)
EOSINOPHIL NFR BLD AUTO: 4 % (ref 0–6)
ERYTHROCYTE [DISTWIDTH] IN BLOOD BY AUTOMATED COUNT: 13.1 % (ref 11.6–15.1)
GFR SERPL CREATININE-BSD FRML MDRD: 78 ML/MIN/1.73SQ M
GLUCOSE SERPL-MCNC: 96 MG/DL (ref 65–140)
GLUCOSE UR STRIP-MCNC: NEGATIVE MG/DL
HCT VFR BLD AUTO: 45.2 % (ref 36.5–49.3)
HGB BLD-MCNC: 15.2 G/DL (ref 12–17)
HGB UR QL STRIP.AUTO: NEGATIVE
IMM GRANULOCYTES # BLD AUTO: 0.07 THOUSAND/UL (ref 0–0.2)
IMM GRANULOCYTES NFR BLD AUTO: 1 % (ref 0–2)
KETONES UR STRIP-MCNC: NEGATIVE MG/DL
LEUKOCYTE ESTERASE UR QL STRIP: ABNORMAL
LIPASE SERPL-CCNC: <6 U/L (ref 11–82)
LYMPHOCYTES # BLD AUTO: 2.04 THOUSANDS/ÂΜL (ref 0.6–4.47)
LYMPHOCYTES NFR BLD AUTO: 18 % (ref 14–44)
MCH RBC QN AUTO: 31.4 PG (ref 26.8–34.3)
MCHC RBC AUTO-ENTMCNC: 33.6 G/DL (ref 31.4–37.4)
MCV RBC AUTO: 93 FL (ref 82–98)
MONOCYTES # BLD AUTO: 1.34 THOUSAND/ÂΜL (ref 0.17–1.22)
MONOCYTES NFR BLD AUTO: 12 % (ref 4–12)
NEUTROPHILS # BLD AUTO: 7.4 THOUSANDS/ÂΜL (ref 1.85–7.62)
NEUTS SEG NFR BLD AUTO: 64 % (ref 43–75)
NITRITE UR QL STRIP: POSITIVE
NON-SQ EPI CELLS URNS QL MICRO: ABNORMAL /HPF
NRBC BLD AUTO-RTO: 0 /100 WBCS
PH UR STRIP.AUTO: 7.5 [PH]
PLATELET # BLD AUTO: 207 THOUSANDS/UL (ref 149–390)
PMV BLD AUTO: 8.9 FL (ref 8.9–12.7)
POTASSIUM SERPL-SCNC: 3.5 MMOL/L (ref 3.5–5.3)
PROT SERPL-MCNC: 6.8 G/DL (ref 6.4–8.4)
PROT UR STRIP-MCNC: NEGATIVE MG/DL
RBC # BLD AUTO: 4.84 MILLION/UL (ref 3.88–5.62)
RBC #/AREA URNS AUTO: ABNORMAL /HPF
SODIUM SERPL-SCNC: 136 MMOL/L (ref 135–147)
SP GR UR STRIP.AUTO: 1.01 (ref 1–1.03)
UROBILINOGEN UR STRIP-ACNC: 2 MG/DL
WBC # BLD AUTO: 11.38 THOUSAND/UL (ref 4.31–10.16)
WBC #/AREA URNS AUTO: ABNORMAL /HPF

## 2023-07-31 PROCEDURE — 96365 THER/PROPH/DIAG IV INF INIT: CPT

## 2023-07-31 PROCEDURE — 36415 COLL VENOUS BLD VENIPUNCTURE: CPT | Performed by: EMERGENCY MEDICINE

## 2023-07-31 PROCEDURE — G1004 CDSM NDSC: HCPCS

## 2023-07-31 PROCEDURE — 83690 ASSAY OF LIPASE: CPT | Performed by: EMERGENCY MEDICINE

## 2023-07-31 PROCEDURE — 71250 CT THORAX DX C-: CPT

## 2023-07-31 PROCEDURE — 72170 X-RAY EXAM OF PELVIS: CPT

## 2023-07-31 PROCEDURE — 73552 X-RAY EXAM OF FEMUR 2/>: CPT

## 2023-07-31 PROCEDURE — 99285 EMERGENCY DEPT VISIT HI MDM: CPT | Performed by: EMERGENCY MEDICINE

## 2023-07-31 PROCEDURE — 96366 THER/PROPH/DIAG IV INF ADDON: CPT

## 2023-07-31 PROCEDURE — 85025 COMPLETE CBC W/AUTO DIFF WBC: CPT | Performed by: EMERGENCY MEDICINE

## 2023-07-31 PROCEDURE — 99284 EMERGENCY DEPT VISIT MOD MDM: CPT

## 2023-07-31 PROCEDURE — 80053 COMPREHEN METABOLIC PANEL: CPT | Performed by: EMERGENCY MEDICINE

## 2023-07-31 PROCEDURE — 74176 CT ABD & PELVIS W/O CONTRAST: CPT

## 2023-07-31 PROCEDURE — 81001 URINALYSIS AUTO W/SCOPE: CPT | Performed by: EMERGENCY MEDICINE

## 2023-07-31 RX ORDER — ACETAMINOPHEN 325 MG/1
650 TABLET ORAL ONCE
Status: COMPLETED | OUTPATIENT
Start: 2023-07-31 | End: 2023-07-31

## 2023-07-31 RX ADMIN — ACETAMINOPHEN 650 MG: 325 TABLET, FILM COATED ORAL at 16:04

## 2023-07-31 RX ADMIN — CEFTRIAXONE SODIUM 1000 MG: 10 INJECTION, POWDER, FOR SOLUTION INTRAVENOUS at 17:25

## 2023-08-03 NOTE — ED PROVIDER NOTES
History  Chief Complaint   Patient presents with   • Possible UTI     Pt presents from [facility] via EMS. Pt has baseline confusion per EMS. Facility staff reports foul smelling urine, flank pain, pt denies pain. Concerned for UTI. 66-year-old male with a history of dementia presents to the emergency department from [address] [place] [address] for evaluation. Staff noted the patient to have increased confusion from his baseline as well as foul-smelling urine. The patient has been intermittently complaining of left flank pain. Patient does have a history of recurrent urinary tract infections. No recent instrumentation. No reported fevers or chills. No vomiting. Patient did have decreased appetite today. Patient did tell his son that he had a fall yesterday this has not been confirmed. He does intermittently complain of left lateral thigh pain. History provided by:  Patient, relative and EMS personnel  History limited by:  Dementia  Medical Problem  Location:  Foul-smelling urine  Quality:  Increased confusion from baseline  Severity:  Unable to specify  Onset quality:  Unable to specify  Timing:  Unable to specify  Progression:  Unchanged  Chronicity:  Recurrent  Context:  History of recurrent urinary tract infections  Relieved by:  Nothing  Worsened by:  Nothing  Ineffective treatments:  None  Associated symptoms: no abdominal pain, no congestion, no cough, no fever, no headaches, no shortness of breath and no vomiting        Prior to Admission Medications   Prescriptions Last Dose Informant Patient Reported? Taking?    Cholecalciferol (VITAMIN D) 2000 units CAPS  Self, Outside Facility (Specify) Yes No   Sig: Take 1,000 Units by mouth daily    Patient not taking: Reported on 12/15/2022   amLODIPine (NORVASC) 5 mg tablet  Self, Outside Facility (Specify) No No   Sig: TAKE 1 TABLET BY MOUTH ONCE DAILY   Patient not taking: Reported on 12/15/2022   aspirin 81 mg chewable tablet  Self, Outside Facility (Specify) Yes No   Sig: Chew 81 mg every other day     cefTRIAXone (ROCEPHIN) 1 g injection  Outside Facility (Specify) Yes No   Patient not taking: Reported on 12/15/2022   cefdinir (OMNICEF) 300 mg capsule  Outside Facility (Specify) Yes No   Patient not taking: Reported on 12/15/2022   divalproex sodium (DEPAKOTE) 250 mg EC tablet  Outside Facility (Specify) No No   Sig: Take 1 tablet (250 mg total) by mouth daily at bedtime   Patient not taking: Reported on 12/15/2022   ergocalciferol (VITAMIN D2) 50,000 units  Outside Facility (Specify) Yes No   Patient not taking: Reported on 12/15/2022   escitalopram (LEXAPRO) 10 mg tablet  Outside Facility (Specify) Yes No   escitalopram (LEXAPRO) 5 mg tablet  Outside Facility (Specify) No No   Sig: Take 1 tablet (5 mg total) by mouth daily   Patient not taking: Reported on 12/15/2022   nystatin (MYCOSTATIN) powder  Outside Facility (Specify) Yes No   Patient not taking: Reported on 12/15/2022   omeprazole (PriLOSEC) 20 mg delayed release capsule  Outside Facility (Specify) Yes No   ondansetron (ZOFRAN) 4 mg tablet  Outside Facility (Specify) Yes No   Patient not taking: Reported on 12/15/2022   senna (SENOKOT) 8.6 mg  Outside Facility (Specify) Yes No   terazosin (HYTRIN) 5 mg capsule  Self, Outside Facility (Specify) Yes No   Sig: Take 5 mg by mouth daily at bedtime      Facility-Administered Medications: None       History reviewed. No pertinent past medical history. History reviewed. No pertinent surgical history. History reviewed. No pertinent family history. I have reviewed and agree with the history as documented.     E-Cigarette/Vaping   • E-Cigarette Use Never User      E-Cigarette/Vaping Substances   • Nicotine No    • THC No    • CBD No    • Flavoring No    • Other No    • Unknown No      Social History     Tobacco Use   • Smoking status: Former     Types: Cigarettes     Quit date:      Years since quittin.6   • Smokeless tobacco: Never   Vaping Use • Vaping Use: Never used   Substance Use Topics   • Alcohol use: Not Currently   • Drug use: Never       Review of Systems   Unable to perform ROS: Dementia   Constitutional: Positive for appetite change. Negative for fever. HENT: Negative for congestion. Respiratory: Negative for cough and shortness of breath. Gastrointestinal: Negative for abdominal pain and vomiting. Genitourinary: Positive for frequency. Musculoskeletal: Positive for gait problem. Neurological: Negative for headaches. All other systems reviewed and are negative. Physical Exam  Physical Exam  Vitals reviewed. Constitutional:       General: He is not in acute distress. Appearance: Normal appearance. He is well-developed. HENT:      Head: Normocephalic and atraumatic. Right Ear: External ear normal.      Left Ear: External ear normal.      Nose: Nose normal.      Mouth/Throat:      Mouth: Mucous membranes are moist.   Eyes:      General: No scleral icterus. Conjunctiva/sclera: Conjunctivae normal.      Pupils: Pupils are equal, round, and reactive to light. Cardiovascular:      Rate and Rhythm: Normal rate and regular rhythm. Heart sounds: Normal heart sounds. Pulmonary:      Effort: Pulmonary effort is normal. No accessory muscle usage or respiratory distress. Breath sounds: Normal breath sounds. Chest:      Chest wall: No tenderness. Abdominal:      General: Bowel sounds are normal. There is no distension. Palpations: Abdomen is soft. Tenderness: There is no abdominal tenderness. There is no right CVA tenderness, left CVA tenderness, guarding or rebound. Musculoskeletal:         General: No deformity. Normal range of motion. Cervical back: Normal range of motion and neck supple. Left upper leg: Tenderness present. Legs:    Lymphadenopathy:      Cervical: No cervical adenopathy. Skin:     General: Skin is warm and dry. Coloration: Skin is pale. Findings: No rash. Neurological:      Mental Status: He is alert and oriented to person, place, and time. Coordination: Coordination normal.      Deep Tendon Reflexes: Reflexes are normal and symmetric. Psychiatric:         Behavior: Behavior normal.         Thought Content:  Thought content normal.         Judgment: Judgment normal.         Vital Signs  ED Triage Vitals   Temperature Pulse Respirations Blood Pressure SpO2   07/31/23 1630 07/31/23 1455 07/31/23 1455 07/31/23 1455 07/31/23 1455   98.8 °F (37.1 °C) 89 18 130/80 95 %      Temp Source Heart Rate Source Patient Position - Orthostatic VS BP Location FiO2 (%)   07/31/23 1630 07/31/23 1455 07/31/23 1455 07/31/23 1630 --   Oral Monitor Sitting Right arm       Pain Score       07/31/23 1604       4           Vitals:    07/31/23 1455 07/31/23 1630 07/31/23 1730   BP: 130/80 142/65 128/73   Pulse: 89 83 79   Patient Position - Orthostatic VS: Sitting Lying Lying         Visual Acuity      ED Medications  Medications   acetaminophen (TYLENOL) tablet 650 mg (650 mg Oral Given 7/31/23 1604)   ceftriaxone (ROCEPHIN) 1 g/50 mL in dextrose IVPB (0 mg Intravenous Stopped 7/31/23 1915)       Diagnostic Studies  Results Reviewed     Procedure Component Value Units Date/Time    Urine Microscopic [[id number]]  (Abnormal) Collected: 07/31/23 1531    Lab Status: Final result Specimen: Urine, Other Updated: 07/31/23 1611     RBC, UA 1-2 /hpf      WBC, UA 2-4 /hpf      Epithelial Cells Occasional /hpf      Bacteria, UA Innumerable /hpf      Amorphous Crystals, UA Occasional    Lipase [[id number]]  (Abnormal) Collected: 07/31/23 1530    Lab Status: Final result Specimen: Blood from Arm, Right Updated: 07/31/23 1607     Lipase <6 u/L     Comprehensive metabolic panel [[id number]]  (Abnormal) Collected: 07/31/23 1530    Lab Status: Final result Specimen: Blood from Arm, Right Updated: 07/31/23 1606     Sodium 136 mmol/L      Potassium 3.5 mmol/L      Chloride 104 mmol/L CO2 25 mmol/L      ANION GAP 7 mmol/L      BUN 13 mg/dL      Creatinine 0.83 mg/dL      Glucose 96 mg/dL      Calcium 9.3 mg/dL      AST 16 U/L      ALT 12 U/L      Alkaline Phosphatase 79 U/L      Total Protein 6.8 g/dL      Albumin 3.5 g/dL      Total Bilirubin 1.07 mg/dL      eGFR 78 ml/min/1.73sq m     Narrative:      National Kidney Disease Foundation guidelines for Chronic Kidney Disease (CKD):   •  Stage 1 with normal or high GFR (GFR > 90 mL/min/1.73 square meters)  •  Stage 2 Mild CKD (GFR = 60-89 mL/min/1.73 square meters)  •  Stage 3A Moderate CKD (GFR = 45-59 mL/min/1.73 square meters)  •  Stage 3B Moderate CKD (GFR = 30-44 mL/min/1.73 square meters)  •  Stage 4 Severe CKD (GFR = 15-29 mL/min/1.73 square meters)  •  Stage 5 End Stage CKD (GFR <15 mL/min/1.73 square meters)  Note: GFR calculation is accurate only with a steady state creatinine    UA w Reflex to Microscopic w Reflex to Culture [[id number]]  (Abnormal) Collected: 07/31/23 1531    Lab Status: Final result Specimen: Urine, Other Updated: 07/31/23 1547     Color, UA Light Yellow     Clarity, UA Clear     Specific Gravity, UA 1.014     pH, UA 7.5     Leukocytes, UA Trace     Nitrite, UA Positive     Protein, UA Negative mg/dl      Glucose, UA Negative mg/dl      Ketones, UA Negative mg/dl      Urobilinogen, UA 2.0 mg/dl      Bilirubin, UA Negative     Occult Blood, UA Negative    CBC and differential [[id number]]  (Abnormal) Collected: 07/31/23 1530    Lab Status: Final result Specimen: Blood from Arm, Right Updated: 07/31/23 1545     WBC 11.38 Thousand/uL      RBC 4.84 Million/uL      Hemoglobin 15.2 g/dL      Hematocrit 45.2 %      MCV 93 fL      MCH 31.4 pg      MCHC 33.6 g/dL      RDW 13.1 %      MPV 8.9 fL      Platelets 884 Thousands/uL      nRBC 0 /100 WBCs      Neutrophils Relative 64 %      Immat GRANS % 1 %      Lymphocytes Relative 18 %      Monocytes Relative 12 %      Eosinophils Relative 4 %      Basophils Relative 1 % Neutrophils Absolute 7.40 Thousands/µL      Immature Grans Absolute 0.07 Thousand/uL      Lymphocytes Absolute 2.04 Thousands/µL      Monocytes Absolute 1.34 Thousand/µL      Eosinophils Absolute 0.44 Thousand/µL      Basophils Absolute 0.09 Thousands/µL                  CT chest wo contrast   Final Result by Ovidio Elder MD (07/31 1900)      Widened appearance of the mediastinum on the  image from the renal CT from today is unchanged from the  image from the chest CT from 2018 due to benign 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 Workstation performed: [id number]         XR femur 2 views LEFT   ED Interpretation by Maggie Sahu DO (07/31 1803)   Normal      Final Result by Samir Hankins MD (08/01 1206)      No acute osseous abnormality. Workstation performed: [id number]         XR pelvis ap only 1 or 2 vw   ED Interpretation by Maggie Sahu DO (07/31 1803)   Normal no fracture      Final Result by Samir Hankins MD (08/01 1205)      No acute osseous abnormality. Workstation performed: [id number]         CT renal stone study abdomen pelvis without contrast   Final Result by Ladena Frankel, MD (07/31 1643)      Nonobstructing calculus in the right kidney. No evidence of hydronephrosis or ureteral calculus. Left colon diverticulosis without CT evidence of diverticulitis. Fecal stasis. Gallstones with milk of calcium probably evident.  projection demonstrates mediastinal widening perhaps due to aortic ectasia and should be correlated with any chest pain. Noncontrast chest CT is suggested. Compression deformity at T11 appears more likely chronic. This was discussed with Dr. Nikki Carlson at 4:40 p.m.             Workstation performed: [id number]                    Procedures  Procedures         ED Course                               SBIRT 22yo+    Flowsheet Row Most Recent Value   Initial Alcohol Screen: US AUDIT-C 1. How often do you have a drink containing alcohol? 0 Filed at: 07/31/2023 1457   2. How many drinks containing alcohol do you have on a typical day you are drinking? 0 Filed at: 07/31/2023 1457   3a. Male UNDER 65: How often do you have five or more drinks on one occasion? 0 Filed at: 07/31/2023 1457   3b. FEMALE Any Age, or MALE 65+: How often do you have 4 or more drinks on one occassion? 0 Filed at: 07/31/2023 1457   Audit-C Score 0 Filed at: 07/31/2023 1457   MARAL: How many times in the past year have you. .. Used an illegal drug or used a prescription medication for non-medical reasons? Never Filed at: 07/31/2023 [address] [facility]  70-year-old male presents with increased confusion and foul-smelling urine. Differential diagnosis includes but is not limited to acute urinary tract infection, electrolyte abnormality, progression of underlying neurologic disease, renal failure. Acute urinary tract infection: acute illness or injury  Aortic ectasia, thoracic ([address]): chronic illness or injury  Amount and/or Complexity of Data Reviewed  Independent Historian: caregiver     Details: Patient's son at bedside to help provide history  External Data Reviewed: notes. Details: Notes from previous hospital admissions and urine culture results reviewed by me. Labs: ordered. Details: Labs ordered and independently interpreted by me, patient with mild leukocytosis and urine concerning for acute urinary tract infection. Radiology: ordered and independent interpretation performed. Details: X-ray of the left femur and pelvis independently interpreted by me, my interpretation is no acute osseous abnormality. CT is interpreted by radiology. Risk  OTC drugs. Risk Details: 70-year-old male presents with increasing confusion. Patient was found to have a urinary tract infection.   Patient's son is a physician and feels comfortable transporting the patient back to the nursing facility where he will speak with the PCP regarding continued treatment for suspected UTI. Patient did receive IV Rocephin in the emergency department. The facility is able to give a IM antibiotics. Discussed signs and symptoms to return to the emergency department. Disposition  Final diagnoses:   Acute urinary tract infection   Aortic ectasia, thoracic ([address])     Time reflects when diagnosis was documented in both MDM as applicable and the Disposition within this note     Time User Action Codes Description Comment    7/31/2023  7:06 PM Katelyn Rogel Add [N39.0] Acute urinary tract infection     7/31/2023  7:10 PM Marta Olimpia Add [I77.810] Aortic ectasia, thoracic [facility])       ED Disposition     ED Disposition   Discharge    Condition   Stable    Date/Time   Mon Jul 31, 2023  7:06 PM    Comment   Lee Walton discharge to home/self care.                Follow-up Information     Follow up With Specialties Details Why David Tovar MD Geriatric Medicine Schedule an appointment as soon as possible for a visit  For recheck of current symptoms           Discharge Medication List as of 7/31/2023  7:11 PM      CONTINUE these medications which have NOT CHANGED    Details   amLODIPine (NORVASC) 5 mg tablet TAKE 1 TABLET BY MOUTH ONCE DAILY, Normal      aspirin 81 mg chewable tablet Chew 81 mg every other day  , Historical Med      cefdinir (OMNICEF) 300 mg capsule Starting Fri 7/22/2022, Historical Med      cefTRIAXone (ROCEPHIN) 1 g injection Starting Tue 7/19/2022, Historical Med      Cholecalciferol (VITAMIN D) 2000 units CAPS Take 1,000 Units by mouth daily , Historical Med      divalproex sodium (DEPAKOTE) 250 mg EC tablet Take 1 tablet (250 mg total) by mouth daily at bedtime, Starting Tue 2/2/2021, Normal      ergocalciferol (VITAMIN D2) 50,000 units Starting Thu 11/10/2022, Historical Med      !! escitalopram (LEXAPRO) 10 mg tablet Starting Tue 9/20/2022, Historical Med      !! escitalopram (LEXAPRO) 5 mg tablet Take 1 tablet (5 mg total) by mouth daily, Starting Thu 4/29/2021, Normal      nystatin (MYCOSTATIN) powder Starting Mon 9/19/2022, Historical Med      omeprazole (PriLOSEC) 20 mg delayed release capsule Starting Tue 9/20/2022, Historical Med      ondansetron (ZOFRAN) 4 mg tablet Starting Tue 7/19/2022, Historical Med      senna (SENOKOT) 8.6 mg Starting Thu 10/20/2022, Historical Med      terazosin (HYTRIN) 5 mg capsule Take 5 mg by mouth daily at bedtime, Historical Med       !! - Potential duplicate medications found. Please discuss with provider. No discharge procedures on file.     PDMP Review     None          ED Provider  Electronically Signed by           Tony Stiles DO  08/02/23 6007

## 2023-09-11 NOTE — Clinical Note
[facility]  1934  80year-old male currently resides at [facility] with a history of unspecified dementia with agitation, HTN, HLD, frequent UTI, depression, visual hallucinations and lack of coordination. Pt has had more frequent falls, one last week. Pt now had decreased appetite, difficulty eating, and now requires pills to be crushed. 7lbs wt loss in last 45 days, albumin 3.5 in July. Pt had episode where he became unresponsive on 9/10 while being changed by staff and found to have O2 SAT 84% on RA. Pt was placed on 4L via NC and improved to 98% and improved to baseline, now back on RA. Pt also has c/o of increased generalized pain and now taking liquid morphine. Pt son would like to avoid any future hospitalizations and focus on patient comfort. Can we approve RLOC?

## 2023-09-16 ENCOUNTER — HOME CARE VISIT (OUTPATIENT)
Dept: HOME HOSPICE | Facility: HOSPICE | Age: 88
End: 2023-09-16
Payer: MEDICARE

## 2023-09-28 ENCOUNTER — HOME CARE VISIT (OUTPATIENT)
Dept: HOME HOSPICE | Facility: HOSPICE | Age: 88
End: 2023-09-28
Payer: MEDICARE

## 2023-09-28 NOTE — CASE COMMUNICATION
Theodore Cordova, Bereavement Final IDG [id number] (1MR) Due: 23   : 1934  SOC: 23  DOD: 9/15/23  Diagnosis: Dementia  Primary: Son- Marline Gitelman was an 80year old  man at [facility]. 4 sons. Sami Kearney is only contact for hospice and bereavement. Sami Kearney is an ER doctor. He has 3 brothers. 1 is a critical care nurse, one is a  for a Fire Department in [place] and the third is an ED nurse. Family was very frustrated  that he was short of breath and showing signs of pain and restlessness, stating facility staff did not give him his PRN medications overnight. Sami Kearney was [employer] [address] [place] provided prayers of peace for Sami Kearney and family. TOD: Facility pronounced. RN left a message for Sami Kearney expressing condolences to the family and thanking them for advocating for their father at EOL. CC: MSW Nolberto Dykes called Sami Kearney and offered condolences. Luis M ponce voiced there being a lot of family around supporting him. Sami Kearney senior's Son was in from out of state and a grandson returned early from vacation. MSW acknowledged it has not been long since family experienced their mother's loss at . Sami Kearney verbalized that "it is not up to us though". Sami Kearney voiced appreciation for the supports from bereavement. He is aware if any other family members care to be followed, they are welcome. Call Ivania norton:  BC Megan May to reassess son Theodore Cordova at [facility]  (Due: 23) *CALL WAS MADE AND POC ALREADY SET BY BC ON 23*